# Patient Record
Sex: MALE | Race: WHITE | NOT HISPANIC OR LATINO | Employment: FULL TIME | URBAN - METROPOLITAN AREA
[De-identification: names, ages, dates, MRNs, and addresses within clinical notes are randomized per-mention and may not be internally consistent; named-entity substitution may affect disease eponyms.]

---

## 2023-08-15 LAB
ALANINE AMINOTRANSFERASE (SGPT) (U/L) IN SER/PLAS: 11 U/L (ref 10–52)
ALBUMIN (G/DL) IN SER/PLAS: 4.2 G/DL (ref 3.4–5)
ALKALINE PHOSPHATASE (U/L) IN SER/PLAS: 65 U/L (ref 33–120)
ANION GAP IN SER/PLAS: 9 MMOL/L (ref 10–20)
ASPARTATE AMINOTRANSFERASE (SGOT) (U/L) IN SER/PLAS: 15 U/L (ref 9–39)
BILIRUBIN TOTAL (MG/DL) IN SER/PLAS: 0.6 MG/DL (ref 0–1.2)
CALCIDIOL (25 OH VITAMIN D3) (NG/ML) IN SER/PLAS: 28 NG/ML
CALCIUM (MG/DL) IN SER/PLAS: 9.2 MG/DL (ref 8.6–10.3)
CARBON DIOXIDE, TOTAL (MMOL/L) IN SER/PLAS: 30 MMOL/L (ref 21–32)
CHLORIDE (MMOL/L) IN SER/PLAS: 104 MMOL/L (ref 98–107)
CREATININE (MG/DL) IN SER/PLAS: 0.99 MG/DL (ref 0.5–1.3)
GFR MALE: 90 ML/MIN/1.73M2
GLUCOSE (MG/DL) IN SER/PLAS: 91 MG/DL (ref 74–99)
PARATHYRIN INTACT (PG/ML) IN SER/PLAS: 52.7 PG/ML (ref 18.5–88)
PHOSPHATE (MG/DL) IN SER/PLAS: 2.8 MG/DL (ref 2.5–4.9)
POC CALCIUM IONIZED (MMOL/L) IN BLOOD: 1.2 MMOL/L (ref 1.1–1.33)
POTASSIUM (MMOL/L) IN SER/PLAS: 4.6 MMOL/L (ref 3.5–5.3)
PROTEIN TOTAL: 6.7 G/DL (ref 6.4–8.2)
SODIUM (MMOL/L) IN SER/PLAS: 138 MMOL/L (ref 136–145)
THYROTROPIN (MIU/L) IN SER/PLAS BY DETECTION LIMIT <= 0.05 MIU/L: 1.2 MIU/L (ref 0.44–3.98)
THYROXINE (T4) FREE (NG/DL) IN SER/PLAS: 1.04 NG/DL (ref 0.61–1.12)
TRIIODOTHYRONINE (T3) FREE (PG/ML) IN SER/PLAS: 3.7 PG/ML (ref 2.3–4.2)
UREA NITROGEN (MG/DL) IN SER/PLAS: 13 MG/DL (ref 6–23)

## 2023-08-18 LAB — VITAMIN D 1,25-DIHYDROXY: 57.6 PG/ML (ref 19.9–79.3)

## 2023-10-02 ENCOUNTER — LAB (OUTPATIENT)
Dept: LAB | Facility: LAB | Age: 58
End: 2023-10-02
Payer: COMMERCIAL

## 2023-10-02 DIAGNOSIS — D35.1 BENIGN NEOPLASM OF PARATHYROID GLAND: Primary | ICD-10-CM

## 2023-10-02 LAB
CALCIUM (MG/L) IN 24 HOUR URINE: 4 MG/24H (ref 100–300)
CALCIUM 24H UR-MRATE: 14.8 MG/DL
COLLECT DURATION TIME SPEC: 24 HRS
CREAT 24H UR-MCNC: 74.7 MG/DL (ref 20–370)
CREAT 24H UR-MRATE: 0.02 G/24 H
SPECIMEN VOL 24H UR: 24 ML

## 2023-10-03 LAB
CALCIUM (MG/DL) IN URINE: NORMAL
CALCIUM (MG/L) IN 24 HOUR URINE: NORMAL
CALCIUM/CREAT RATIO - DATA CONVERSION: NORMAL MG/G CREAT (ref 0–209)
COLLECTION DURATION OF URINE: NORMAL HR
CREATININE (MG/24HR) IN 24 HOUR URINE: NORMAL
CREATININE (MG/DL) IN URINE: NORMAL
VOLUME OF URINE: NORMAL ML

## 2023-12-29 ENCOUNTER — HOSPITAL ENCOUNTER (OUTPATIENT)
Dept: RADIOLOGY | Facility: HOSPITAL | Age: 58
Discharge: HOME | End: 2023-12-29
Payer: COMMERCIAL

## 2023-12-29 DIAGNOSIS — M25.512 PAIN IN LEFT SHOULDER: ICD-10-CM

## 2023-12-29 PROCEDURE — 73221 MRI JOINT UPR EXTREM W/O DYE: CPT | Mod: LT

## 2023-12-29 PROCEDURE — 73221 MRI JOINT UPR EXTREM W/O DYE: CPT | Mod: LEFT SIDE | Performed by: STUDENT IN AN ORGANIZED HEALTH CARE EDUCATION/TRAINING PROGRAM

## 2024-01-13 ENCOUNTER — APPOINTMENT (OUTPATIENT)
Dept: RADIOLOGY | Facility: HOSPITAL | Age: 59
End: 2024-01-13
Payer: COMMERCIAL

## 2024-01-24 ENCOUNTER — EVALUATION (OUTPATIENT)
Dept: PHYSICAL THERAPY | Facility: CLINIC | Age: 59
End: 2024-01-24
Payer: COMMERCIAL

## 2024-01-24 DIAGNOSIS — M43.6 NECK STIFFNESS: ICD-10-CM

## 2024-01-24 DIAGNOSIS — M54.2 NECK PAIN: Primary | ICD-10-CM

## 2024-01-24 DIAGNOSIS — M50.321 OTHER CERVICAL DISC DEGENERATION AT C4-C5 LEVEL: ICD-10-CM

## 2024-01-24 DIAGNOSIS — M47.812 SPONDYLOSIS WITHOUT MYELOPATHY OR RADICULOPATHY, CERVICAL REGION: ICD-10-CM

## 2024-01-24 PROCEDURE — 97161 PT EVAL LOW COMPLEX 20 MIN: CPT | Mod: GP

## 2024-01-24 PROCEDURE — 97140 MANUAL THERAPY 1/> REGIONS: CPT | Mod: GP

## 2024-01-24 ASSESSMENT — ENCOUNTER SYMPTOMS
DEPRESSION: 0
LOSS OF SENSATION IN FEET: 0
OCCASIONAL FEELINGS OF UNSTEADINESS: 0

## 2024-01-24 ASSESSMENT — PATIENT HEALTH QUESTIONNAIRE - PHQ9
2. FEELING DOWN, DEPRESSED OR HOPELESS: NOT AT ALL
1. LITTLE INTEREST OR PLEASURE IN DOING THINGS: NOT AT ALL
SUM OF ALL RESPONSES TO PHQ9 QUESTIONS 1 AND 2: 0

## 2024-01-24 NOTE — LETTER
April 15, 2024      No Recipients    Patient: Arian Forde   YOB: 1965   Date of Visit: 1/24/2024       Dear No referring provider defined for this encounter.    The attached plan of care is being sent to you because your patient’s medical reimbursement requires that you certify the plan of care. Your signature is required to allow uninterrupted insurance coverage.      You may indicate your approval by signing below and faxing this form back to us at Dept Fax: [phone].    Please call Dept: [phone] with any questions or concerns.    Thank you for this referral,        Stephania Louie , PT  [facility] [place] [address] [place], [place] [address] [facility] [address]    Payer: Payor: BRADY / Plan: [employer] / Product Type: *No Product type* /                                                                         Date:     Dear Stephania Louie , PT,     Re: Mr. Arian Forde, MRN:[id number]    I certify that I have reviewed the attached plan of care and it is medically necessary for Mr. Arian Forde (1965) who is under my care.          ______________________________________                    _________________  Provider name and credentials                                           Date and time                                                                                           Plan of Care 1/24/24   Effective from: 1/24/2024  Effective to: 3/21/2024    Plan ID: [id number]                Participants as of Finalize on 1/25/2024      Name Type Comments Contact Info    Stephania Louie, PT Physical Therapist  [phone]    Nakul Meyer MD PCP - General  [phone]           Last Plan Note       Author: Stephania Louie PT Status: Incomplete Last edited: 1/24/2024  3:45 PM         Physical Therapy Evaluation and Treatment      Patient Name: Arian Forde  MRN: [id number]  Today's Date: 1/24/2024     Time Calculation  Start Time: 1545  Stop Time:  1645  Time Calculation (min): 60 min  PT Therapeutic Procedures Time Entry  Manual Therapy Time Entry: 40  Self-Care/Home Mgmt Trainin  Visit:   Referred by: Nakul Meyer MD    Insurance:  [facility]   Certification Dates:  24 to 3-  Current Problem:   1. Neck pain        2. Neck stiffness              Subjective:  Pt stated he has been having neck stiffness for about 2 yrs now.  He see a chiropractic and LMT    Patient's Living Environment/PLOF: Pt lives with spouse and son.  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  Patient's Therapy Goals: to decrease stiffness, be able to move his neck better     Pain:  Intensity: 2/10            Location: entire cervical spine, bilaterally            Duration: Constant            Aggravating Factor: prolonged positions            Alleviating Factor:  current with chiropractic, and LMT          Objective  Cervical AROM: (degrees)   Flexion 37   Extension 20   Right Sidebend 15   Left Sidebend 13   Right Rotation 32   Left Rotation 32     Posture:  FHRS,   Rt shoulder FROM all planes, strength 5/5 at shld, elbow wrist/hand  Lt shoulder 3/4 ROM for flex abd, and ER at 90,  IR to Lt glute,  MMT NT + for RTCT, awaiting surgery in April  Palpation:  Special tests:   Dermatomal Impairment:    Outcome Measures:  NDI:  30%    Treatments:  Manual:    Assessment:     Pt is a 57 yo male who presented to the clinic today c/o neck stiffness.  PMH significant for Left RTCT,Lt bicep tendon repair ,back surgery ,L4,L5 fracture.  Examination reveals the following deficits:  significant ROM deficits in his c-spine,all planes.  decreased flexibility, poor posture, limitations with ADL's due to stiffness. These deficits lead to difficulties with ADLs as well as recreational activity. Skilled PT will address these deficits to help reduce pain for return to PLOF. He will benefit from skilled PT to address the above mentioned impairments.      Low  complexity due to patient's clinical presentation being stable and uncomplicated by any significant comorbidities that may affect rehab tolerance and progression.     Plan: 2 x week for 6 wks, to include HEP, self-management, ther ex for UE strengthening, activity progression, modalities for pain relief, posture re-ed, NMR         EDUCATION: HEP, POC, activity modifications/progression, pain management/modalities, and injury pathology     Goals:   Active       PT Problem       PT Goal 1       Start:  01/24/24    Expected End:  03/20/24       Pt independent with HEP and consistent for optimal recovery and self-management after DC from PT.             PT Goal 2       Start:  01/24/24    Expected End:  03/20/24       Pt will decrease neck pain to < or = 2-3/10 to improve tolerance to ADL's, work         PT Goal 3       Start:  01/24/24    Expected End:  03/20/24       Pt will decrease NDI to < or = 20% to demonstrate functional improvement.           PT Goal 4       Start:  01/24/24    Expected End:  03/20/24       Pt will improve cervical AROM to > or = 3/4 AROM all planes to improve functional mobility, decrease stiffness                                Current Participants as of 4/15/2024      Name Type Comments Contact Info    Stephania Louie, PT Physical Therapist  [phone]    Signed by Stephania Louie, GREG     Signature documented by Stephania Louie PT at 3/14/2024 1023 EDT     Reason: Participant Signed on Paper    Nakul Meyer MD PCP - General  [phone]

## 2024-01-24 NOTE — PROGRESS NOTES
Physical Therapy Evaluation and Treatment      Patient Name: Arian Forde  MRN: [id number]  Today's Date: 24     Time Calculation  Start Time: 1545  Stop Time: 5  Time Calculation (min): 60 min  PT Therapeutic Procedures Time Entry  Manual Therapy Time Entry: 40  Self-Care/Home Mgmt Trainin  Visit:   Referred by: Nakul Meyer MD  Insurance:  [facility]   Certification Dates:  24 to 3-  Current Problem: {M47.812, M50.321]    1. Neck pain        2. Neck stiffness        3. Spondylosis without myelopathy or radiculopathy, cervical region        4. Other cervical disc degeneration at C4-C5 level              Subjective:  Pt stated he has been having neck stiffness for about 2 yrs now.  He see a chiropractic and LMT    Patient's Living Environment/PLOF: Pt lives with spouse and son.  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  Patient's Therapy Goals: to decrease stiffness, be able to move his neck better     Pain:  Intensity: 2/10            Location: entire cervical spine, bilaterally            Duration: Constant            Aggravating Factor: prolonged positions            Alleviating Factor:  current with chiropractic, and LMT          Objective   Cervical AROM: (degrees)   Flexion 37   Extension 20   Right Sidebend 15   Left Sidebend 13   Right Rotation 32   Left Rotation 32     Posture:  FHRS, kyphosis  Rt shoulder FROM all planes, strength 5/5 at shld, elbow wrist/hand  Lt shoulder 3/4 ROM for flex abd, and ER at 90,  IR to Lt glute,  MMT NT + for RTCT, awaiting surgery in April  Palpation:  + tenderness, Rt C2-C4, bilateral UT's, CTJ  Outcome Measures:  NDI:  30%    Treatments:    Manual:  Performed rotational grade IV mobs, bilaterally to upper cervical and lower segments C2-C7, side glides, (B), overpressure with max rotation into flexion, , and PA mobs T2-T5 and adjacent ribs, inferior glides for pain relief     Assessment:     Pt is a 57 yo male who presented to  the clinic today c/o neck stiffness.  PMH significant for Left RTCT,Lt bicep tendon repair 2019,back surgery 1985,L4,L5 fracture.  Examination reveals the following deficits:  significant ROM deficits in his c-spine,all planes.  decreased flexibility, poor posture, limitations with ADL's due to stiffness. These deficits lead to difficulties with ADLs as well as recreational activity. Skilled PT will address these deficits to help reduce pain for return to PLOF. He will benefit from skilled PT to address the above mentioned impairments.      Low complexity due to patient's clinical presentation being stable and uncomplicated by any significant comorbidities that may affect rehab tolerance and progression.     Plan: 2 x week for 6 wks, to include HEP, self-management, ther ex for UE strengthening, activity progression, modalities for pain relief, posture re-ed, NMR         EDUCATION: HEP, POC, activity modifications/progression, pain management/modalities, and injury pathology     Goals:   Active       PT Problem       PT Goal 1       Start:  01/24/24    Expected End:  03/20/24       Pt independent with HEP and consistent for optimal recovery and self-management after DC from PT.             PT Goal 2       Start:  01/24/24    Expected End:  03/20/24       Pt will decrease neck pain to < or = 2-3/10 to improve tolerance to ADL's, work         PT Goal 3       Start:  01/24/24    Expected End:  03/20/24       Pt will decrease NDI to < or = 20% to demonstrate functional improvement.           PT Goal 4       Start:  01/24/24    Expected End:  03/20/24       Pt will improve cervical AROM to > or = 3/4 AROM all planes to improve functional mobility, decrease stiffness

## 2024-01-24 NOTE — Clinical Note
March 14, 2024      No Recipients    Patient: Arian Forde   YOB: 1965   Date of Visit: 1/24/2024       Dear No referring provider defined for this encounter.    The attached plan of care is being sent to you because your patient’s medical reimbursement requires that you certify the plan of care. Your signature is required to allow uninterrupted insurance coverage.      You may indicate your approval by signing below and faxing this form back to us at Dept Fax: [phone].    Please call Dept: [phone] with any questions or concerns.    Thank you for this referral,        Stephania Louie , PT  [facility] [address] [place], [place] [address] [facility] [address]    Payer: Payor: BRADY / Plan: [employer] / Product Type: *No Product type* /                                                                         Date:     Dear Stephania Louie , PT,     Re: Mr. Arian Forde, MRN:[id number]    I certify that I have reviewed the attached plan of care and it is medically necessary for Mr. Arian Forde (1965) who is under my care.          ______________________________________                    _________________  Provider name and credentials                                           Date and time                                                                                           Plan of Care 1/24/24   Effective from: 1/24/2024  Effective to: 3/21/2024    Plan ID: [id number]            Participants as of Finalize on 1/25/2024    Name Type Comments Contact Info    Stephania Louie, PT Physical Therapist  [phone]    Nakul Meyer MD PCP - General  [phone]       Last Plan Note     Author: Stephania Louie PT Status: Incomplete Last edited: 1/24/2024  3:45 PM       Physical Therapy Evaluation and Treatment      Patient Name: Arian Forde  MRN: [id number]  Today's Date: 1/24/2024     Time Calculation  Start Time: 1545  Stop Time: 1645  Time  Calculation (min): 60 min  PT Therapeutic Procedures Time Entry  Manual Therapy Time Entry: 40  Self-Care/Home Mgmt Trainin  Visit:   Referred by: Nakul Meyer MD    Insurance:  [facility]   Certification Dates:  24 to 3-  Current Problem:   1. Neck pain        2. Neck stiffness              Subjective:  Pt stated he has been having neck stiffness for about 2 yrs now.  He see a chiropractic and LMT    Patient's Living Environment/PLOF: Pt lives with spouse and son.  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  Patient's Therapy Goals: to decrease stiffness, be able to move his neck better     Pain:  Intensity: 2/10            Location: entire cervical spine, bilaterally            Duration: Constant            Aggravating Factor: prolonged positions            Alleviating Factor:  current with chiropractic, and LMT          Objective  Cervical AROM: (degrees)   Flexion 37   Extension 20   Right Sidebend 15   Left Sidebend 13   Right Rotation 32   Left Rotation 32     Posture:  FHRS,   Rt shoulder FROM all planes, strength 5/5 at shld, elbow wrist/hand  Lt shoulder 3/4 ROM for flex abd, and ER at 90,  IR to Lt glute,  MMT NT + for RTCT, awaiting surgery in April  Palpation:  Special tests:   Dermatomal Impairment:    Outcome Measures:  NDI:  30%    Treatments:  Manual:    Assessment:     Pt is a 59 yo male who presented to the clinic today c/o neck stiffness.  PMH significant for Left RTCT,Lt bicep tendon repair ,back surgery ,L4,L5 fracture.  Examination reveals the following deficits:  significant ROM deficits in his c-spine,all planes.  decreased flexibility, poor posture, limitations with ADL's due to stiffness. These deficits lead to difficulties with ADLs as well as recreational activity. Skilled PT will address these deficits to help reduce pain for return to PLOF. He will benefit from skilled PT to address the above mentioned impairments.      Low complexity due to  patient's clinical presentation being stable and uncomplicated by any significant comorbidities that may affect rehab tolerance and progression.     Plan: 2 x week for 6 wks, to include HEP, self-management, ther ex for UE strengthening, activity progression, modalities for pain relief, posture re-ed, NMR         EDUCATION: HEP, POC, activity modifications/progression, pain management/modalities, and injury pathology     Goals:   Active       PT Problem       PT Goal 1       Start:  01/24/24    Expected End:  03/20/24       Pt independent with HEP and consistent for optimal recovery and self-management after DC from PT.             PT Goal 2       Start:  01/24/24    Expected End:  03/20/24       Pt will decrease neck pain to < or = 2-3/10 to improve tolerance to ADL's, work         PT Goal 3       Start:  01/24/24    Expected End:  03/20/24       Pt will decrease NDI to < or = 20% to demonstrate functional improvement.           PT Goal 4       Start:  01/24/24    Expected End:  03/20/24       Pt will improve cervical AROM to > or = 3/4 AROM all planes to improve functional mobility, decrease stiffness                                  Current Participants as of 3/14/2024    Name Type Comments Contact Info    Stephania Louie, PT Physical Therapist  [phone]    Signed by Stephania Louie PT     Signature documented by Stephania Louie PT at 3/14/2024 1023 EDT     Reason: Participant Signed on Paper    Nakul Meyer MD PCP - General  [phone]

## 2024-01-24 NOTE — LETTER
March 14, 2024    Nakul Meyer MD  [address] [facility] [address]    Patient: Arian Forde   YOB: 1965   Date of Visit: 1/24/2024       Dear No referring provider defined for this encounter.    The attached plan of care is being sent to you because your patient’s medical reimbursement requires that you certify the plan of care. Your signature is required to allow uninterrupted insurance coverage.      You may indicate your approval by signing below and faxing this form back to us at Dept Fax: [phone].    Please call Dept: [phone] with any questions or concerns.    Thank you for this referral,        Stephania Louie , PT  [facility] [address] [place], [place] [address] [facility] [address]    Payer: Payor: BRADY / Plan: [employer] / Product Type: *No Product type* /                                                                         Date:     Dear Stephania Louie PT,     Re: Mr. Arian Forde, MRN:[id number]    I certify that I have reviewed the attached plan of care and it is medically necessary for Mr. Arian Forde (1965) who is under my care.          ______________________________________                    _________________  Provider name and credentials                                           Date and time                                                                                           Plan of Care 1/24/24   Effective from: 1/24/2024  Effective to: 3/21/2024    Plan ID: [id number]                Participants as of Finalize on 1/25/2024      Name Type Comments Contact Info    Stephania Louie PT Physical Therapist  [phone]    Nakul Meyer MD PCP - General  [phone]           Last Plan Note       Author: Stephania Louie PT Status: Incomplete Last edited: 1/24/2024  3:45 PM         Physical Therapy Evaluation and Treatment      Patient Name: Arian Forde  MRN: [id number]  Today's Date:  2024     Time Calculation  Start Time: 1545  Stop Time: 1645  Time Calculation (min): 60 min  PT Therapeutic Procedures Time Entry  Manual Therapy Time Entry: 40  Self-Care/Home Mgmt Trainin  Visit:   Referred by: Nakul Meyer MD    Insurance:  [facility]   Certification Dates:  24 to 3-  Current Problem:   1. Neck pain        2. Neck stiffness              Subjective:  Pt stated he has been having neck stiffness for about 2 yrs now.  He see a chiropractic and LMT    Patient's Living Environment/PLOF: Pt lives with spouse and son.  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  Patient's Therapy Goals: to decrease stiffness, be able to move his neck better     Pain:  Intensity: 2/10            Location: entire cervical spine, bilaterally            Duration: Constant            Aggravating Factor: prolonged positions            Alleviating Factor:  current with chiropractic, and LMT          Objective  Cervical AROM: (degrees)   Flexion 37   Extension 20   Right Sidebend 15   Left Sidebend 13   Right Rotation 32   Left Rotation 32     Posture:  FHRS,   Rt shoulder FROM all planes, strength 5/5 at shld, elbow wrist/hand  Lt shoulder 3/4 ROM for flex abd, and ER at 90,  IR to Lt glute,  MMT NT + for RTCT, awaiting surgery in April  Palpation:  Special tests:   Dermatomal Impairment:    Outcome Measures:  NDI:  30%    Treatments:  Manual:    Assessment:     Pt is a 57 yo male who presented to the clinic today c/o neck stiffness.  PMH significant for Left RTCT,Lt bicep tendon repair ,back surgery ,L4,L5 fracture.  Examination reveals the following deficits:  significant ROM deficits in his c-spine,all planes.  decreased flexibility, poor posture, limitations with ADL's due to stiffness. These deficits lead to difficulties with ADLs as well as recreational activity. Skilled PT will address these deficits to help reduce pain for return to PLOF. He will benefit from skilled PT  to address the above mentioned impairments.      Low complexity due to patient's clinical presentation being stable and uncomplicated by any significant comorbidities that may affect rehab tolerance and progression.     Plan: 2 x week for 6 wks, to include HEP, self-management, ther ex for UE strengthening, activity progression, modalities for pain relief, posture re-ed, NMR         EDUCATION: HEP, POC, activity modifications/progression, pain management/modalities, and injury pathology     Goals:   Active       PT Problem       PT Goal 1       Start:  01/24/24    Expected End:  03/20/24       Pt independent with HEP and consistent for optimal recovery and self-management after DC from PT.             PT Goal 2       Start:  01/24/24    Expected End:  03/20/24       Pt will decrease neck pain to < or = 2-3/10 to improve tolerance to ADL's, work         PT Goal 3       Start:  01/24/24    Expected End:  03/20/24       Pt will decrease NDI to < or = 20% to demonstrate functional improvement.           PT Goal 4       Start:  01/24/24    Expected End:  03/20/24       Pt will improve cervical AROM to > or = 3/4 AROM all planes to improve functional mobility, decrease stiffness                                Current Participants as of 3/14/2024      Name Type Comments Contact Info    Stephania Louie PT Physical Therapist  [phone]    Signed by Stephania Louie PT     Signature documented by Stephania Louie PT at 3/14/2024 1023 EDT     Reason: Participant Signed on Paper    Nakul Meyer MD PCP - General  [phone]

## 2024-01-25 PROBLEM — M50.321 OTHER CERVICAL DISC DEGENERATION AT C4-C5 LEVEL: Status: ACTIVE | Noted: 2024-01-25

## 2024-01-25 PROBLEM — M47.812 SPONDYLOSIS WITHOUT MYELOPATHY OR RADICULOPATHY, CERVICAL REGION: Status: ACTIVE | Noted: 2024-01-25

## 2024-01-29 ENCOUNTER — TREATMENT (OUTPATIENT)
Dept: PHYSICAL THERAPY | Facility: CLINIC | Age: 59
End: 2024-01-29
Payer: COMMERCIAL

## 2024-01-29 ENCOUNTER — OFFICE VISIT (OUTPATIENT)
Dept: ORTHOPEDIC SURGERY | Facility: CLINIC | Age: 59
End: 2024-01-29
Payer: COMMERCIAL

## 2024-01-29 VITALS — WEIGHT: 190 LBS | BODY MASS INDEX: 28.79 KG/M2 | HEIGHT: 68 IN

## 2024-01-29 DIAGNOSIS — M25.812 SHOULDER IMPINGEMENT, LEFT: ICD-10-CM

## 2024-01-29 DIAGNOSIS — M47.812 SPONDYLOSIS WITHOUT MYELOPATHY OR RADICULOPATHY, CERVICAL REGION: ICD-10-CM

## 2024-01-29 DIAGNOSIS — M50.321 OTHER CERVICAL DISC DEGENERATION AT C4-C5 LEVEL: ICD-10-CM

## 2024-01-29 DIAGNOSIS — M54.2 NECK PAIN: ICD-10-CM

## 2024-01-29 DIAGNOSIS — S43.432A DEGENERATIVE SUPERIOR LABRAL ANTERIOR-TO-POSTERIOR (SLAP) TEAR OF LEFT SHOULDER: ICD-10-CM

## 2024-01-29 DIAGNOSIS — M43.6 NECK STIFFNESS: Primary | ICD-10-CM

## 2024-01-29 DIAGNOSIS — M75.122 NONTRAUMATIC COMPLETE TEAR OF LEFT ROTATOR CUFF: Primary | ICD-10-CM

## 2024-01-29 DIAGNOSIS — M75.22 BICEPS TENDINITIS, LEFT: ICD-10-CM

## 2024-01-29 PROCEDURE — 97140 MANUAL THERAPY 1/> REGIONS: CPT | Mod: GP

## 2024-01-29 PROCEDURE — 99204 OFFICE O/P NEW MOD 45 MIN: CPT | Performed by: STUDENT IN AN ORGANIZED HEALTH CARE EDUCATION/TRAINING PROGRAM

## 2024-01-29 PROCEDURE — L3670 SO ACRO/CLAV CAN WEB PRE OTS: HCPCS | Performed by: STUDENT IN AN ORGANIZED HEALTH CARE EDUCATION/TRAINING PROGRAM

## 2024-01-29 RX ORDER — TIZANIDINE 4 MG/1
4 TABLET ORAL NIGHTLY PRN
COMMUNITY
Start: 2023-05-12

## 2024-01-29 ASSESSMENT — PAIN - FUNCTIONAL ASSESSMENT: PAIN_FUNCTIONAL_ASSESSMENT: 0-10

## 2024-01-29 ASSESSMENT — ENCOUNTER SYMPTOMS
OCCASIONAL FEELINGS OF UNSTEADINESS: 0
DEPRESSION: 0
LOSS OF SENSATION IN FEET: 0

## 2024-01-29 ASSESSMENT — PAIN SCALES - GENERAL: PAINLEVEL_OUTOF10: 2

## 2024-01-29 NOTE — PROGRESS NOTES
PRIMARY CARE PHYSICIAN: Nakul Meyer MD  REFERRING PROVIDER: Nakul Meyer MD  [address] [place] [facility] [address]     CONSULT ORTHOPAEDIC: Shoulder Evaluation    ASSESSMENT & PLAN    Impression: 58 y.o. male with left shoulder full-thickness rotator cuff tear, degenerative SLAP tear, biceps tendinosis, subacromial impingement/bursitis.    Plan:   I explained to the patient the nature of their diagnosis.  I reviewed their imaging studies with them.    Based on the history, physical exam and imaging studies above, the patient's presentation is consistent with the above diagnosis.  I had a long discussion with the patient regarding their presentation and the treatment options.  We discussed initial nonoperative versus operative management options as well as potential further diagnostic imaging.  I reviewed the patient's MRI findings with him.  We discussed the treatment options going forward.  He is active and requires the arm to be functional and strong to do his normal daily activities and work.  After long discussion, I do recommend surgical intervention to repair his full-thickness rotator cuff tear and the patient agrees.  The plan is for a left shoulder arthroscopy, rotator cuff repair, intra-articular debridement and synovectomy, subacromial decompression and acromioplasty and open subpectoral biceps tenodesis.  I thoroughly explained the risks and benefits as well as the expected postoperative timeline for the proposed procedure versus nonoperative management. Risks of this procedure include but are not limited to bleeding, infection, nerve injury, DVT and failure of repair or implant.  The patient expressed understanding and wished to proceed with surgical intervention.  All questions were answered. We will begin the presurgical process and find them a surgical date in a timely fashion that works for them.    The patient will require an abduction sling for postoperative joint  stability. Additionally, in order to decrease the amount of narcotic pain medication usage and improve his pain control and swelling, I recommend a cryotherapy machine.    At the end of the visit, all questions were answered in full. The patient is in agreement with the plan and recommendations. They will call the office with any questions/concerns.    Note dictated with [employer] software. Completed without full typed error editing and sent to avoid delay.       SUBJECTIVE  CHIEF COMPLAINT:   Chief Complaint   Patient presents with    Left Shoulder - Pain      HPI: Arian Forde is a 58 y.o. patient. Arian Forde complains of left shoulder pain for about a year with no known injury.  He did have a prior left distal biceps tendon repair on 07/2019.  He has had pain in his shoulder off and on since then.  He has pain with motion but good range of motion.  He states he has noticed he is losing strength.  He failed nonoperative management including physical therapy, activity modification and anti-inflammatories.  He had a recent MRI done which confirms a full-thickness rotator cuff tear.  He tried Meloxicam with no relief.  He was referred to me for surgical consultation.    They deny any associated neck pain.  No numbness, tingling, or paresthesias.    REVIEW OF SYSTEMS  Constitutional: See HPI for pain assessment, No significant weight loss, recent trauma  Cardiovascular: No chest pain, shortness of breath  Respiratory: No difficulty breathing, cough  Gastrointestinal: No nausea, vomiting, diarrhea, constipation  Musculoskeletal: Noted in HPI, positive for pain, restricted motion, stiffness  Integumentary: No rashes, easy bruising, redness   Neurological: no numbness or tingling in extremities, no gait disturbances   Psychiatric: No mood changes, memory changes, social issues  Heme/Lymph: no excessive swelling, easy bruising, excessive bleeding  ENT: No hearing changes  Eyes: No vision  changes    No past medical history on file.     Not on File     No past surgical history on file.     No family history on file.     Social History     Socioeconomic History    Marital status:      Spouse name: Not on file    Number of children: Not on file    Years of education: Not on file    Highest education level: Not on file   Occupational History    Not on file   Tobacco Use    Smoking status: Not on file    Smokeless tobacco: Not on file   Substance and Sexual Activity    Alcohol use: Not on file    Drug use: Not on file    Sexual activity: Not on file   Other Topics Concern    Not on file   Social History Narrative    Not on file     Social Determinants of Health     Financial Resource Strain: Not on file   Food Insecurity: Not on file   Transportation Needs: Not on file   Physical Activity: Not on file   Stress: Not on file   Social Connections: Not on file   Intimate Partner Violence: Not on file   Housing Stability: Not on file        CURRENT MEDICATIONS:   No current outpatient medications on file.     No current facility-administered medications for this visit.        OBJECTIVE    PHYSICAL EXAM      12/29/2023     8:00 AM   Vitals   Weight (lb) 150      There is no height or weight on file to calculate BMI.    GENERAL: A/Ox3, NAD. Appears healthy, well nourished  PSYCHIATRIC: Mood stable, appropriate memory recall  EYES: EOM intact, no scleral icterus  CARDIOVASCULAR: Palpable peripheral pulses  LUNGS: Breathing non-labored on room air  SKIN: no erythema, rashes, or ecchymosis     MUSCULOSKELETAL:  Laterality: left Shoulder Exam  - Negative Spurlings, full painless neck ROM  - Skin intact  - No erythema or warmth  - No ecchymosis or soft tissue swelling  - Shoulder ROM: Forward flexion to 160 with a hitch at 90, ER 45, IR upper lumbar  - Strength:      Jobes 4/5     ER 5-/5     Belly press and bearhug 5-/5  - Palpation: Positive tenderness biceps groove and posterolateral acromion  - Ramirez and  Neers positive  - Speeds and O'Briens positive    NEUROVASCULAR:  - Neurovascular Status: sensation intact to light touch distally, upper extremity motor grossly intact  - Capillary refill brisk at extremities, Bilateral peripheral pulses 2+    Imaging: MRI of the left shoulder reviewed by me demonstrates a full-thickness supraspinatus tear, minimal degenerative changes of the glenohumeral joint, degenerative SLAP tear, biceps tendinosis, subacromial impingement/bursitis.  Images were personally reviewed and interpreted by me.  Radiology reports were reviewed by me as well, if readily available at the time.      Horacio Mukherjee MD  Attending Surgeon    Sports Medicine Orthopaedic Surgery  [facility] [place]  [facility]

## 2024-01-30 NOTE — PROGRESS NOTES
Physical Therapy Treatment Note     Patient Name: Arian Forde  MRN: [id number]  Today's Date: 1-29-24     Time Calculation  Start Time: 1530  Stop Time: 1630  Time Calculation (min): 60 min  PT Therapeutic Procedures Time Entry  Manual Therapy Time Entry: 53  Therapeutic Exercise Time Entry: 5  Visit: 2/12  Referred by: Nakul Meyer MD  Insurance:  [facility]   Certification Dates:  1-24-24 to 3-  Current Problem: {M47.812, M50.321]    1. Neck stiffness        2. Neck pain        3. Spondylosis without myelopathy or radiculopathy, cervical region        4. Other cervical disc degeneration at C4-C5 level              Subjective:  No issues after the 1st visit. He saw a case this morning, was in the car about 3 + hrs, neck is stiff from driving.    Patient's Living Environment/PLOF: Pt lives with spouse and son.  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    Patient's Therapy Goals: to decrease stiffness, be able to move his neck better     Pain:  Intensity: 2/10            Location: entire cervical spine, bilaterally, right C2 >> left            Duration: Constant            Aggravating Factor: prolonged positions            Alleviating Factor:  current with chiropractic, and LMT          Objective     Treatments:    Exercise:      Levator Scap stretch,  3 x 30 sec  Upper Trap Stretch, 3 x 30 sec    Manual:  Performed rotational grade IV mobs, bilaterally to upper cervical and lower segments C2-C7, side glides, (+ cavitation on the right,  (B), overpressure with max rotation into flexion, , and PA mobs C2-T5 and adjacent ribs in spine, inferior glides for pain relief, suboccipital release, distraction, upper trap & lev scap stretching with overpressure     Assessment:     Pt presented to clinic very guarded, obvious stiff neck.  Manual performed as noted.  He had less symptoms, more mobility post treatment.      Plan: Continue per POC, to include HEP, self-management, ther ex for UE strengthening, activity progression, modalities for pain relief, posture re-ed, NMR

## 2024-01-31 PROBLEM — M25.812 SHOULDER IMPINGEMENT, LEFT: Status: ACTIVE | Noted: 2024-01-29

## 2024-01-31 PROBLEM — S43.432A DEGENERATIVE SUPERIOR LABRAL ANTERIOR-TO-POSTERIOR (SLAP) TEAR OF LEFT SHOULDER: Status: ACTIVE | Noted: 2024-01-29

## 2024-01-31 PROBLEM — M75.122 NONTRAUMATIC COMPLETE TEAR OF LEFT ROTATOR CUFF: Status: ACTIVE | Noted: 2024-01-29

## 2024-01-31 PROBLEM — M75.22 BICEPS TENDINITIS, LEFT: Status: ACTIVE | Noted: 2024-01-29

## 2024-02-05 ENCOUNTER — TREATMENT (OUTPATIENT)
Dept: PHYSICAL THERAPY | Facility: CLINIC | Age: 59
End: 2024-02-05
Payer: COMMERCIAL

## 2024-02-05 DIAGNOSIS — M54.2 NECK PAIN: ICD-10-CM

## 2024-02-05 DIAGNOSIS — M43.6 NECK STIFFNESS: ICD-10-CM

## 2024-02-05 DIAGNOSIS — M50.321 OTHER CERVICAL DISC DEGENERATION AT C4-C5 LEVEL: ICD-10-CM

## 2024-02-05 DIAGNOSIS — M47.812 SPONDYLOSIS WITHOUT MYELOPATHY OR RADICULOPATHY, CERVICAL REGION: Primary | ICD-10-CM

## 2024-02-05 PROCEDURE — 97140 MANUAL THERAPY 1/> REGIONS: CPT | Mod: GP

## 2024-02-05 NOTE — PROGRESS NOTES
Physical Therapy Treatment Note     Patient Name: Arian Forde  MRN: [id number]  Today's Date: 2-5-24     Time Calculation  Start Time: 1530  Stop Time: 1630  Time Calculation (min): 60 min  PT Therapeutic Procedures Time Entry  Manual Therapy Time Entry: 55  Therapeutic Exercise Time Entry: 5  Visit: 3/12  Referred by: Nakul Meyer MD  Insurance:  [facility]   Certification Dates:  1-24-24 to 3-  Current Problem: {M47.812, M50.321]    1. Spondylosis without myelopathy or radiculopathy, cervical region        2. Other cervical disc degeneration at C4-C5 level        3. Neck pain        4. Neck stiffness              Subjective:  No issues after the visits, occasional soreness. He saw a case this morning, working & driving causes additional stiffness.      Patient's Living Environment/PLOF: Pt lives with spouse and son.  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    Patient's Therapy Goals: to decrease stiffness, be able to move his neck better     Pain:  Intensity: 2/10            Location: entire cervical spine, bilaterally, right C2 >> left            Duration: Constant            Aggravating Factor: prolonged positions            Alleviating Factor:  current with chiropractic, and LMT, his son has also been trying to help him stretch          Objective     Treatments:    Exercise:      Levator Scap stretch,  3 x 30 sec  Upper Trap Stretch, 3 x 30 sec    Manual:  Performed rotational grade IV mobs, bilaterally to upper cervical and lower segments C2-C7, side glides, (+ cavitation on the right,  (B), overpressure with max rotation into flexion, and PA mobs C2-T5 and adjacent ribs in spine, inferior glides for pain relief, suboccipital release, distraction, upper trap & lev scap stretching with overpressure, CTJ grade IV mobs (B), had cavitation on left with distraction     Assessment:     Pt presented to  "clinic very guarded, stiff neck posturing, upper traps \"hiked\".  Manual performed as noted.  He had less symptoms, more mobility post treatment.     Plan: Continue per POC, to include HEP, self-management, ther ex for UE strengthening, activity progression, modalities for pain relief, posture re-ed, NMR                         "

## 2024-02-08 ENCOUNTER — TREATMENT (OUTPATIENT)
Dept: PHYSICAL THERAPY | Facility: CLINIC | Age: 59
End: 2024-02-08
Payer: COMMERCIAL

## 2024-02-08 DIAGNOSIS — M47.812 SPONDYLOSIS WITHOUT MYELOPATHY OR RADICULOPATHY, CERVICAL REGION: ICD-10-CM

## 2024-02-08 DIAGNOSIS — M50.321 OTHER CERVICAL DISC DEGENERATION AT C4-C5 LEVEL: ICD-10-CM

## 2024-02-08 DIAGNOSIS — M54.2 NECK PAIN: Primary | ICD-10-CM

## 2024-02-08 DIAGNOSIS — M43.6 NECK STIFFNESS: ICD-10-CM

## 2024-02-08 PROCEDURE — 97140 MANUAL THERAPY 1/> REGIONS: CPT | Mod: GP

## 2024-02-09 NOTE — PROGRESS NOTES
Physical Therapy Treatment Note     Patient Name: Arian Forde  MRN: [id number]  Today's Date: 2/9/2024     Time Calculation  Start Time: 1745  Stop Time: 1845  Time Calculation (min): 60 min  PT Therapeutic Procedures Time Entry  Manual Therapy Time Entry: 55  Therapeutic Exercise Time Entry: 5  Visit: 4/12  Referred by: Nakul Meyer MD  Insurance:  [facility]   Certification Dates:  1-24-24 to 3-  Current Problem: {M47.812, M50.321]    1. Neck pain        2. Neck stiffness        3. Spondylosis without myelopathy or radiculopathy, cervical region        4. Other cervical disc degeneration at C4-C5 level              Subjective:  Pt stated he was really sosre after the last viit, but he has more mobility.   He was adjusted prior to today's visit, lots of cavitations in the t-spine.    Patient's Living Environment/PLOF: Pt lives with spouse and son.  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    Patient's Therapy Goals: to decrease stiffness, be able to move his neck better     Pain:  Intensity: 2/10            Location: entire cervical spine, bilaterally, right >> left            Duration: Constant            Aggravating Factor: prolonged positions            Alleviating Factor:  current with chiropractic, and LMT, his son has also been trying to help him stretch        Objective     Treatments:  Exercise:      Levator Scap stretch,  3 x 30 sec  Upper Trap Stretch, 3 x 30 sec    Manual:  Performed rotational grade IV mobs, bilaterally to upper cervical and lower segments C2-C7, side glides, (+ cavitation on the right,  (B), overpressure with max rotation into flexion, and PA mobs C2-T5 and adjacent ribs in spine, inferior glides for pain relief, suboccipital release, distraction, upper trap & lev scap stretching with overpressure, CTJ grade IV mobs (B), had smaller cavitations thrhoughout today    "  Assessment:     Pt stated benefit of seeing chiro, MT and PT.  It's the first time in a long while that he can rotate his neck without turning the body.  He stiffens up again after about 24-36 hrs though.  His typical posture is FHRS, guarded, stiff neck posturing, upper traps \"hiked\".  Manual performed as noted.  He reported less symptoms, more mobility post treatment.     Plan: Continue per POC and pt tolerance      "

## 2024-02-13 ENCOUNTER — APPOINTMENT (OUTPATIENT)
Dept: PHYSICAL THERAPY | Facility: CLINIC | Age: 59
End: 2024-02-13
Payer: COMMERCIAL

## 2024-02-20 ENCOUNTER — TREATMENT (OUTPATIENT)
Dept: PHYSICAL THERAPY | Facility: CLINIC | Age: 59
End: 2024-02-20
Payer: COMMERCIAL

## 2024-02-20 DIAGNOSIS — M47.812 SPONDYLOSIS WITHOUT MYELOPATHY OR RADICULOPATHY, CERVICAL REGION: ICD-10-CM

## 2024-02-20 DIAGNOSIS — M50.321 OTHER CERVICAL DISC DEGENERATION AT C4-C5 LEVEL: Primary | ICD-10-CM

## 2024-02-20 DIAGNOSIS — M43.6 NECK STIFFNESS: ICD-10-CM

## 2024-02-20 DIAGNOSIS — M54.2 NECK PAIN: ICD-10-CM

## 2024-02-20 PROCEDURE — 97140 MANUAL THERAPY 1/> REGIONS: CPT | Mod: GP

## 2024-02-20 NOTE — PROGRESS NOTES
Physical Therapy Treatment Note     Patient Name: Arian Forde  MRN: [id number]  Today's Date: 2/20/2024     Time Calculation  Start Time: 1800  Stop Time: 1900  Time Calculation (min): 60 min  PT Therapeutic Procedures Time Entry  Manual Therapy Time Entry: 60  Visit: 5/12  Referred by: Nakul Meyer MD  [facility]   Certification Dates:  1-24-24 to 3-  Current Problem: {M47.812, M50.321]    1. Other cervical disc degeneration at C4-C5 level  Follow Up In Physical Therapy      2. Spondylosis without myelopathy or radiculopathy, cervical region  Follow Up In Physical Therapy      3. Neck stiffness        4. Neck pain                Subjective:  Pt stated he has more  mobility after visits.   The new lead vests he is required to wear at work are lighter, which helps.      Patient's Living Environment/PLOF: Pt lives with spouse and son.  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    Patient's Therapy Goals: to decrease stiffness, be able to move his neck better     Pain:  Intensity: 1-2/10            Location: entire cervical spine, bilaterally, right >> left            Duration: Constant            Aggravating Factor: prolonged positions            Alleviating Factor:  current with chiropractic, and LMT, his son has also been trying to help him stretch        Objective     Treatments:  Exercise:      Levator Scap stretch,  3 x 30 sec  Upper Trap Stretch, 3 x 30 sec    Manual:  Performed rotational grade IV mobs, bilaterally to upper cervical and lower segments C2-C7, side glides, (B), overpressure with max rotation into flexion, and PA mobs C2-T5 and adjacent ribs in spine, inferior glides for pain relief, suboccipital release, distraction, upper trap & lev scap stretching with overpressure, CTJ grade IV mobs (B)    Assessment:     Pt reports continued benefit of seeing chiro, MT and PT.   "Cervical motion is better, however, he stiffens up again after about 24-36 hrs though.  His typical posture is FHRS, guarded, stiff neck posturing, upper traps \"hiked\".  Manual performed as noted.  He reported less symptoms, more mobility post treatment. He will continue with visits up to his left shld surgery.    Plan: Continue per POC and pt tolerance    "

## 2024-02-22 ENCOUNTER — TREATMENT (OUTPATIENT)
Dept: PHYSICAL THERAPY | Facility: CLINIC | Age: 59
End: 2024-02-22
Payer: COMMERCIAL

## 2024-02-22 DIAGNOSIS — M47.812 SPONDYLOSIS WITHOUT MYELOPATHY OR RADICULOPATHY, CERVICAL REGION: ICD-10-CM

## 2024-02-22 DIAGNOSIS — M50.321 OTHER CERVICAL DISC DEGENERATION AT C4-C5 LEVEL: ICD-10-CM

## 2024-02-22 PROCEDURE — 97140 MANUAL THERAPY 1/> REGIONS: CPT | Mod: GP

## 2024-02-22 NOTE — PROGRESS NOTES
Physical Therapy Treatment Note     Patient Name: Arian Forde  MRN: [id number]  Today's Date: 2/22/2024     PT Therapeutic Procedures Time Entry  Manual Therapy Time Entry: 60  Visit: 6/12  Referred by: Nakul Meyer MD  [facility]   Certification Dates:  1-24-24 to 3-  Current Problem: {M47.812, M50.321]    1. Spondylosis without myelopathy or radiculopathy, cervical region  Follow Up In Physical Therapy      2. Other cervical disc degeneration at C4-C5 level  Follow Up In Physical Therapy              Subjective:  Pt stated he has more  mobility after visits, but he's usually pretty sore.       Patient's Living Environment/PLOF: Pt lives with spouse and son.  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    Patient's Therapy Goals: to decrease stiffness, be able to move his neck better     Pain:  Intensity: 1-2/10            Location: entire cervical spine, bilaterally, right >> l            Duration: Constant            Aggravating Factor: prolonged positions            Alleviating Factor:  current with chiropractic, and LMT, his son has also been trying to help him stretch        Objective     Cervical AROM: (degrees)   Flexion 38   Extension 28   Right Sidebend 16   Left Sidebend 14   Right Rotation 40   Left Rotation 45       Treatments:  Exercise:      Levator Scap stretch,  3 x 30 sec  Upper Trap Stretch, 3 x 30 sec    Manual:  Performed rotational grade IV mobs, bilaterally to upper cervical and lower segments C2-C7, side glides, (B), overpressure with max rotation into flexion, and PA mobs C2-T5 and adjacent ribs in spine, inferior glides for pain relief, suboccipital release, distraction, upper trap & lev scap stretching with overpressure, CTJ grade IV mobs (B)    Assessment:     Pt stated he saw the chiro prior to PT today.  .  Cervical motion is better, however, he stiffens up  "again after about 24-36 hrs though.  His typical posture is FHRS, guarded, stiff neck posturing, upper traps \"hiked\".  Manual performed as noted.  He reported less symptoms, more mobility post treatment.   Plan: Continue per POC and pt tolerance                  "

## 2024-02-27 ENCOUNTER — TREATMENT (OUTPATIENT)
Dept: PHYSICAL THERAPY | Facility: CLINIC | Age: 59
End: 2024-02-27
Payer: COMMERCIAL

## 2024-02-27 DIAGNOSIS — M47.812 SPONDYLOSIS WITHOUT MYELOPATHY OR RADICULOPATHY, CERVICAL REGION: ICD-10-CM

## 2024-02-27 DIAGNOSIS — M50.321 OTHER CERVICAL DISC DEGENERATION AT C4-C5 LEVEL: ICD-10-CM

## 2024-02-27 PROCEDURE — 97140 MANUAL THERAPY 1/> REGIONS: CPT | Mod: GP

## 2024-02-27 PROCEDURE — 97535 SELF CARE MNGMENT TRAINING: CPT | Mod: GP

## 2024-02-28 NOTE — PROGRESS NOTES
Physical Therapy Treatment Note     Patient Name: Arian Forde  MRN: [id number]  Today's Date: 2/27/2024     Time Calculation  Start Time: 1800  Stop Time: 1900  Time Calculation (min): 60 min  PT Therapeutic Procedures Time Entry  Manual Therapy Time Entry: 45  Self-Care/Home Mgmt Training: 15    Visit: 7/12  Referred by: Nakul Meyer MD  Insurance:  [facility]   Certification Dates:  1-24-24 to 3-  Current Problem: {M47.812, M50.321]    1. Spondylosis without myelopathy or radiculopathy, cervical region  Follow Up In Physical Therapy      2. Other cervical disc degeneration at C4-C5 level  Follow Up In Physical Therapy            Subjective:  Pt stated he was trying to stretch his neck hanging it over the edge of the bed & thinks he overdid it.  He is really sore today.      Patient's Living Environment/PLOF: Pt lives with spouse and son.  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    Patient's Therapy Goals: to decrease stiffness, be able to move his neck better     Pain:  Intensity: 1-2/10            Location: entire cervical spine, bilaterally, right >> lt            Duration: Constant            Aggravating Factor: prolonged positions            Alleviating Factor:  current with chiropractic, and LMT, his son has also been trying to help him stretch        Objective     Treatments:    Manual:  Performed rotational grade IV mobs, bilaterally to upper cervical and lower segments C2-C7, side glides, (B), overpressure with max rotation into flexion, and PA mobs C2-T5 and adjacent ribs in spine, inferior glides for pain relief, suboccipital release, distraction, upper trap & lev scap stretching with overpressure, CTJ grade IV mobs (B), inferior glides to T2 -T7, 1st rib mobs, and TPR to multiple areas Rt UT, lev scap    Education:  Wife present for manual stretching, she was educated on cervical  "distraction, occipital release, TPR and UT stretching to provide some carryover between clinic visits    Assessment:     Pt stated decreased stiffness post treatment.  Cervical motion is better, however, stiffness returns after about 24-36 hrs.  His typical posture is FHRS, guarded, stiff neck posturing, upper traps \"hiked\".  Manual performed as noted.  He reported less symptoms, more mobility post treatment. Discussed self mob with [employer]    Plan: Continue per POC and pt tolerance                                "

## 2024-02-29 ENCOUNTER — TREATMENT (OUTPATIENT)
Dept: PHYSICAL THERAPY | Facility: CLINIC | Age: 59
End: 2024-02-29
Payer: COMMERCIAL

## 2024-02-29 DIAGNOSIS — M47.812 SPONDYLOSIS WITHOUT MYELOPATHY OR RADICULOPATHY, CERVICAL REGION: ICD-10-CM

## 2024-02-29 DIAGNOSIS — M50.321 OTHER CERVICAL DISC DEGENERATION AT C4-C5 LEVEL: ICD-10-CM

## 2024-02-29 PROCEDURE — 97140 MANUAL THERAPY 1/> REGIONS: CPT | Mod: GP

## 2024-02-29 NOTE — PROGRESS NOTES
Physical Therapy Treatment Note       Patient Name: Arian Forde  MRN: [id number]  Today's Date: 2/29/2024     Time Calculation  Start Time: 1000  Stop Time: 1045  Time Calculation (min): 45 min  PT Therapeutic Procedures Time Entry  Manual Therapy Time Entry: 43  Visit: 8/12  Referred by: Nakul Meyer MD    [facility]   Certification Dates:  1-24-24 to 3-  Current Problem: {M47.812, M50.321]    1. Spondylosis without myelopathy or radiculopathy, cervical region  Follow Up In Physical Therapy      2. Other cervical disc degeneration at C4-C5 level  Follow Up In Physical Therapy            Subjective:  Pt stated he was really sore after visits, but soreness is less now.  He usually ices after visits.      Patient's Living Environment/PLOF: Pt lives with spouse and son.  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      Patient's Therapy Goals: to decrease stiffness, be able to move his neck better     Pain:  Intensity: 1-2/10            Location: entire cervical spine, bilaterally, Rt >> Lt            Duration: Constant            Aggravating Factor: prolonged positions            Alleviating Factor:  current with chiropractic, and LMT, his son has also been trying to help him stretch        Objective     Treatments:    Manual:  Performed rotational grade IV mobs, bilaterally to upper cervical and lower segments C2-C7, side glides, (B), overpressure with max rotation into flexion, and PA mobs C2-T5 and adjacent ribs in spine, inferior glides for pain relief, suboccipital release, distraction, upper trap & lev scap stretching with overpressure, CTJ grade IV mobs (B), inferior glides to T2 -T7, 1st rib mobs, and TPR to multiple areas Rt UT, lev scap, distraction in sitting with max rotation (B)    Assessment:     Pt stated he has more mobility, motion is easier.  He was not as stiff when he came  "into the clinic this morning.  His typical posture is FHRS, guarded, stiff neck posturing, upper traps \"hiked\".  He stated the lead vest he is required to wear for \"cases\"at work is heavy & he leans over during the procedure to observe, further exacerbating posture related issues.  Manual performed as noted.  He reported less symptoms, more mobility post treatment.     Plan: Continue per POC and pt tolerance                                              "

## 2024-03-05 ENCOUNTER — TREATMENT (OUTPATIENT)
Dept: PHYSICAL THERAPY | Facility: CLINIC | Age: 59
End: 2024-03-05
Payer: COMMERCIAL

## 2024-03-05 DIAGNOSIS — M50.321 OTHER CERVICAL DISC DEGENERATION AT C4-C5 LEVEL: Primary | ICD-10-CM

## 2024-03-05 DIAGNOSIS — M47.812 CERVICAL SPONDYLOSIS WITHOUT MYELOPATHY: ICD-10-CM

## 2024-03-05 DIAGNOSIS — M47.812 SPONDYLOSIS WITHOUT MYELOPATHY OR RADICULOPATHY, CERVICAL REGION: ICD-10-CM

## 2024-03-05 DIAGNOSIS — M50.321 DEGENERATION OF INTERVERTEBRAL DISC AT C4-C5 LEVEL: ICD-10-CM

## 2024-03-05 PROCEDURE — 97140 MANUAL THERAPY 1/> REGIONS: CPT | Mod: GP

## 2024-03-05 NOTE — PROGRESS NOTES
Physical Therapy Treatment Note       Patient Name: Arian Forde  MRN: [id number]  Today's Date:  3-5-24     Time Calculation  Start Time: 1800  Stop Time: 1900  Time Calculation (min): 60 min  PT Therapeutic Procedures Time Entry  Manual Therapy Time Entry: 58  Visit: 9/12  Referred by: Nakul Meyer MD    [facility]   Certification Dates:  1-24-24 to 3-  Current Problem: {M47.812, M50.321]    1. Other cervical disc degeneration at C4-C5 level  Follow Up In Physical Therapy      2. Spondylosis without myelopathy or radiculopathy, cervical region  Follow Up In Physical Therapy            Subjective:  Pt stated his neck did not stiffen back up til today from his visit last week.      Patient's Living Environment/PLOF: Pt lives with spouse and son.  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      Patient's Therapy Goals: to decrease stiffness, be able to move his neck better     Pain:  Intensity: 1-2/10            Location: entire cervical spine, bilaterally, Rt >> Lt            Duration: Constant            Aggravating Factor: prolonged positions            Alleviating Factor:  current with chiropractic, and LMT, his son has also been trying to help him stretch        Objective     Treatments:    Manual:  Performed rotational grade IV mobs, bilaterally to upper cervical and lower segments C2-C7, side glides, (B), overpressure with max rotation into flexion, and PA mobs C2-T5 and adjacent ribs in spine, inferior glides for pain relief, suboccipital release, distraction, upper trap & lev scap stretching with overpressure, CTJ grade IV mobs (B), inferior glides to T2 -T7, 1st rib mobs, and TPR to multiple areas Rt UT, lev scap, distraction in sitting with max rotation (B), pt in mutilpe positions (supine, prone and sitting)    Assessment:     Pt stated he is moving better with longer periods in  between clinic visits prior to stiffening back up in the neck and upper thoracic areas.   He also stated it has helped that he hasn't worn the lead vest over the last several days.  Manual performed as noted.  He reported less symptoms, more mobility post treatment. Multiple cavitations in multiple segments today.    Plan: Continue per POC and pt tolerance

## 2024-03-07 ENCOUNTER — TREATMENT (OUTPATIENT)
Dept: PHYSICAL THERAPY | Facility: CLINIC | Age: 59
End: 2024-03-07
Payer: COMMERCIAL

## 2024-03-07 DIAGNOSIS — M50.321 OTHER CERVICAL DISC DEGENERATION AT C4-C5 LEVEL: ICD-10-CM

## 2024-03-07 DIAGNOSIS — M47.812 SPONDYLOSIS WITHOUT MYELOPATHY OR RADICULOPATHY, CERVICAL REGION: ICD-10-CM

## 2024-03-07 DIAGNOSIS — M47.812 CERVICAL SPONDYLOSIS WITHOUT MYELOPATHY: ICD-10-CM

## 2024-03-07 DIAGNOSIS — M50.321 DEGENERATION OF INTERVERTEBRAL DISC AT C4-C5 LEVEL: ICD-10-CM

## 2024-03-07 PROCEDURE — 97140 MANUAL THERAPY 1/> REGIONS: CPT | Mod: GP

## 2024-03-07 NOTE — PROGRESS NOTES
Physical Therapy Treatment Note       Patient Name: Arian Forde  MRN: [id number]  Today's Date:  3-7-24     Time Calculation  Start Time: 1725  Stop Time: 1825  Time Calculation (min): 60 min  PT Therapeutic Procedures Time Entry  Manual Therapy Time Entry: 60  Visit: 10/12  Referred by: Nakul Meyer MD    Insurance:  [facility]   Certification Dates:  1-24-24 to 3-  Current Problem: {M47.812, M50.321]    1. Spondylosis without myelopathy or radiculopathy, cervical region  Follow Up In Physical Therapy      2. Other cervical disc degeneration at C4-C5 level  Follow Up In Physical Therapy            Subjective:  Pt stated his neck is not as stiff today.      Patient's Living Environment/PLOF: Pt lives with spouse and son.  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      Patient's Therapy Goals: to decrease stiffness, be able to move his neck better     Pain:  Intensity: 1-2/10            Location: entire cervical spine, bilaterally, Rt >> Lt            Duration: Constant            Aggravating Factor: prolonged positions            Alleviating Factor:  current with chiropractic, and LMT, his son has also been trying to help him stretch        Objective     Treatments:    Manual:  Performed rotational grade IV mobs, bilaterally to upper cervical and lower segments C2-C7, side glides, (B), overpressure with max rotation into flexion, and PA mobs C2-T5 and adjacent ribs in spine, inferior glides for pain relief, suboccipital release, distraction, upper trap & lev scap stretching with overpressure, CTJ grade IV mobs (B), inferior glides to T2 -T7, 1st rib mobs, and TPR to multiple areas Rt UT, lev scap, distraction in sitting with max rotation (B), pt in mutilpe positions (supine, prone and sitting) as per previous sessions    Assessment:     Pt stated he is moving better with longer periods in between clinic  visits prior to stiffening back up in the neck and upper thoracic areas.   He also stated it has helped that he hasn't worn the lead vest over the last several days.  Manual performed as noted.  He reported less symptoms, more mobility post treatment. Multiple cavitations in multiple segments today.    Plan: Continue per POC and pt tolerance    GOALS:  Active       PT Problem       PT Goal 1 (Progressing)       Start:  01/24/24    Expected End:  03/20/24       Pt independent with HEP and consistent for optimal recovery and self-management after DC from PT.             PT Goal 2 (Met)       Start:  01/24/24    Expected End:  03/20/24    Resolved:  03/07/24    Pt will decrease neck pain to < or = 2-3/10 to improve tolerance to ADL's, work         PT Goal 3 (Progressing)       Start:  01/24/24    Expected End:  03/20/24       Pt will decrease NDI to < or = 20% to demonstrate functional improvement.           PT Goal 4 (Progressing)       Start:  01/24/24    Expected End:  03/20/24       Pt will improve cervical AROM to > or = 3/4 AROM all planes to improve functional mobility, decrease stiffness

## 2024-03-12 ENCOUNTER — TREATMENT (OUTPATIENT)
Dept: PHYSICAL THERAPY | Facility: CLINIC | Age: 59
End: 2024-03-12
Payer: COMMERCIAL

## 2024-03-12 DIAGNOSIS — M50.321 OTHER CERVICAL DISC DEGENERATION AT C4-C5 LEVEL: ICD-10-CM

## 2024-03-12 DIAGNOSIS — M50.321 DEGENERATION OF INTERVERTEBRAL DISC AT C4-C5 LEVEL: ICD-10-CM

## 2024-03-12 DIAGNOSIS — M47.812 CERVICAL SPONDYLOSIS WITHOUT MYELOPATHY: ICD-10-CM

## 2024-03-12 DIAGNOSIS — M47.812 SPONDYLOSIS WITHOUT MYELOPATHY OR RADICULOPATHY, CERVICAL REGION: ICD-10-CM

## 2024-03-12 PROCEDURE — 97140 MANUAL THERAPY 1/> REGIONS: CPT | Mod: GP

## 2024-03-13 NOTE — PROGRESS NOTES
Physical Therapy Treatment Note       Patient Name: Arian Forde  MRN: [id number]  Today's Date:  3-12-24     Time Calculation  Start Time: 1715  Stop Time: 1815  Time Calculation (min): 60 min  PT Therapeutic Procedures Time Entry  Manual Therapy Time Entry: 55  Visit: 11/12  Referred by: Nakul Meyer MD    [facility]   Certification Dates:  1-24-24 to 3-  Current Problem: {M47.812, M50.321]    1. Spondylosis without myelopathy or radiculopathy, cervical region  Follow Up In Physical Therapy      2. Other cervical disc degeneration at C4-C5 level  Follow Up In Physical Therapy            Subjective:  Pt stated his neck is not as stiff, but he still has difficulty moving it .      Patient's Living Environment/PLOF: Pt lives with spouse and son.  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, he sees a LMT about 1 x month, she performs deep tissue on the right side of my neck, he's also current with a chiroprator    Patient's Therapy Goals: to decrease stiffness, be able to move his neck better     Pain:  Intensity: 1-2/10            Location: entire cervical spine, bilaterally, Rt >> Lt            Duration: Constant            Aggravating Factor: prolonged positions            Alleviating Factor:  current with chiropractic, and LMT, his son & wife have also been trying to help him stretch        Objective     Treatments:    Manual:  Performed rotational grade IV mobs, bilaterally to upper cervical and lower segments C2-C7, side glides, (B), overpressure with max rotation into flexion, and PA mobs C2-T5 and adjacent ribs in supine, inferior glides for pain relief, suboccipital release, distraction, upper trap & lev scap stretching with overpressure, CTJ grade IV mobs (B), inferior glides to T2 -T7, 1st rib mobs, and TPR to multiple areas Rt UT, lev scap, distraction in sitting with max rotation (B), pt in mutilpe positions (supine, prone and sitting) as per previous  sessions    Assessment:     Pt stated he is moving better and his ROM measurements have improved.  However, he still rotates with his trunk due to compensatory movement.  Manual performed as noted.  + cavitations noted today.  He has pain bilaterally C2C3 and corresponding subocciptals.  I was able to get the left side to cavitate.  He reported less symptoms, more mobility post treatment. He would like to continue with PT up to his surgery on April 5th.  He is afraid the neck will stiffen back up after surgery.      Plan: Continue per POC and pt tolerance, extend POC next visit.

## 2024-03-14 ENCOUNTER — APPOINTMENT (OUTPATIENT)
Dept: PHYSICAL THERAPY | Facility: CLINIC | Age: 59
End: 2024-03-14
Payer: COMMERCIAL

## 2024-03-14 ENCOUNTER — TREATMENT (OUTPATIENT)
Dept: PHYSICAL THERAPY | Facility: CLINIC | Age: 59
End: 2024-03-14
Payer: COMMERCIAL

## 2024-03-14 DIAGNOSIS — M47.812 SPONDYLOSIS WITHOUT MYELOPATHY OR RADICULOPATHY, CERVICAL REGION: ICD-10-CM

## 2024-03-14 DIAGNOSIS — M43.6 NECK STIFFNESS: ICD-10-CM

## 2024-03-14 DIAGNOSIS — M50.321 DEGENERATION OF INTERVERTEBRAL DISC AT C4-C5 LEVEL: ICD-10-CM

## 2024-03-14 DIAGNOSIS — M47.812 CERVICAL SPONDYLOSIS WITHOUT MYELOPATHY: ICD-10-CM

## 2024-03-14 DIAGNOSIS — M50.321 OTHER CERVICAL DISC DEGENERATION AT C4-C5 LEVEL: Primary | ICD-10-CM

## 2024-03-14 PROCEDURE — 97140 MANUAL THERAPY 1/> REGIONS: CPT | Mod: GP

## 2024-03-14 NOTE — PROGRESS NOTES
Physical Therapy Treatment Note       Patient Name: Arian Forde  MRN: [id number]  Today's Date:  3-14-24     Time Calculation  Start Time: 1630  Stop Time: 1715  Time Calculation (min): 45 min  PT Therapeutic Procedures Time Entry  Manual Therapy Time Entry: 45  Visit: 12/12  Referred by: Nakul Meyer MD    [facility]   Certification Dates:  1-24-24 to 3-  Current Problem: {M47.812, M50.321]    1. Other cervical disc degeneration at C4-C5 level        2. Spondylosis without myelopathy or radiculopathy, cervical region        3. Neck stiffness              Subjective:  Pt stated his neck is not as stiff, but he still has difficulty moving it .      Patient's Living Environment/PLOF: Pt lives with spouse and son.  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, he sees a LMT about 1 x month, she performs deep tissue on the right side of my neck, he's also current with a chiroprator    Patient's Therapy Goals: to decrease stiffness, be able to move his neck better     Pain:  Intensity: 1-2/10,5/10 after therapy it's sore            Location: entire cervical spine, bilaterally, Rt >> Lt            Duration: Constant            Aggravating Factor: prolonged positions            Alleviating Factor:  current with chiropractic, and LMT, his son & wife have also been trying to help him stretch        Objective     Treatments:    Manual:  Performed rotational grade IV mobs, bilaterally to upper cervical and lower segments C2-C7, side glides, (B), overpressure with max rotation into flexion, and PA mobs C2-T5 and adjacent ribs in supine, inferior glides for pain relief, suboccipital release, distraction, upper trap & lev scap stretching with overpressure, CTJ grade IV mobs (B), inferior glides to T2 -T7, 1st rib mobs, and TPR to multiple areas Rt UT, lev scap, distraction in sitting with max rotation (B), pt in mutilpe positions (supine, prone and sitting) as per previous sessions, pt supine  on 1/2 round so thoracic spine in Extension, towel on chin for distraction & moved into extension    Assessment:     Pt stated he is moving better and his ROM measurements have improved.  Between the chiro, MT & PT, he is moving better.  Manual performed as noted.  + cavitations noted in multiple joints.  He has pain bilaterally C2-C3 and corresponding subocciptals.  I was able to get (B) sides to cavitate at this level today.  He reported chiro has also been able to cavitate more joints, where he was not able to previously.   He stated he is now able to see peripherally when he rotates while driving.  He would like to continue with PT up to his surgery on April 5th.  He is afraid the neck will stiffen back up after surgery.      Plan: Continue per POC and pt tolerance, extend POC next visit.

## 2024-03-18 ENCOUNTER — TREATMENT (OUTPATIENT)
Dept: PHYSICAL THERAPY | Facility: CLINIC | Age: 59
End: 2024-03-18
Payer: COMMERCIAL

## 2024-03-18 DIAGNOSIS — M50.321 DEGENERATION OF INTERVERTEBRAL DISC AT C4-C5 LEVEL: ICD-10-CM

## 2024-03-18 DIAGNOSIS — M47.812 CERVICAL SPONDYLOSIS WITHOUT MYELOPATHY: ICD-10-CM

## 2024-03-18 DIAGNOSIS — M47.812 SPONDYLOSIS WITHOUT MYELOPATHY OR RADICULOPATHY, CERVICAL REGION: ICD-10-CM

## 2024-03-18 DIAGNOSIS — M43.6 NECK STIFFNESS: ICD-10-CM

## 2024-03-18 DIAGNOSIS — M50.321 OTHER CERVICAL DISC DEGENERATION AT C4-C5 LEVEL: Primary | ICD-10-CM

## 2024-03-18 PROCEDURE — 97140 MANUAL THERAPY 1/> REGIONS: CPT | Mod: GP

## 2024-03-18 NOTE — PROGRESS NOTES
Physical Therapy Treatment Note       Patient Name: Arian Forde  MRN: [id number]  Today's Date:  3-18-24     Time Calculation  Start Time: 0700  Stop Time: 0800  Time Calculation (min): 60 min  PT Therapeutic Procedures Time Entry  Manual Therapy Time Entry: 60  Visit: 13/18  Referred by: Nakul Meyer MD    [facility]   Certification Dates:  1-24-24 to 3-  Current Problem: {M47.812, M50.321]    1. Other cervical disc degeneration at C4-C5 level        2. Spondylosis without myelopathy or radiculopathy, cervical region        3. Neck stiffness              Subjective:  Pt stated his neck is not as stiff, he's happy with progress to date, but there's still room to improve.       Patient's Living Environment/PLOF: Pt lives with spouse and son.  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, he sees a LMT about 1 x month, she performs deep tissue on the right side of my neck, he's also current with a chiroprator, he is currently limited with heavy lifting, OH lifting and reaching due to a Left RTCT (4-5-24 surgery)    Patient's Therapy Goals: to decrease stiffness, be able to move his neck better     Pain:  Intensity: 1-2/10, 4-5/10 after therapy it's sore, he has to ice it            Location: entire cervical spine, bilaterally, Rt >> Lt, C3-C7            Duration: dull ache intermittent            Aggravating Factor: prolonged positions, 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            Alleviating Factor:  current with chiropractic, and LMT, his son & wife have also been trying to help him stretch    Precautions:  Left shoulder due to Lt RTCT        Objective     Treatments:    Manual:  Performed rotational grade IV mobs, bilaterally to upper cervical and lower segments C2-C7, side glides, (B), overpressure with max rotation into flexion, and PA mobs C2-T5 and adjacent ribs in supine, inferior glides for pain relief, suboccipital release, distraction, upper trap & lev scap stretching  "with overpressure, CTJ grade IV mobs (B), inferior glides to T2 -T7, 1st rib mobs, and TPR to multiple areas Rt UT, lev scap, distraction in sitting with max rotation (B), pt in mutilpe positions (supine, prone and sitting) as per previous sessions, pt supine on 1/2 round so thoracic spine in Extension, towel on chin for distraction & moved into extension    Assessment:     Pt stated he is moving better and his ROM measurements have improved.  Manual performed as noted.  + cavitations noted in multiple joints.  I was able to get (B) multiple areas to cavitate today.  He reports being able to see peripherally when he rotates while driving now.  He would like to continue with PT up to his surgery on April 5th.  He is afraid the neck will stiffen back up after surgery.  This is the \"loosest\" that's he been in a long while.     Plan:  POC good until 3-21-24. Will extend out until 4-5-24, adding 6 additional visits for a total of 18 visits.  Continue with mobility prior to upcoming surgery.                                                     "

## 2024-03-19 ENCOUNTER — PRE-ADMISSION TESTING (OUTPATIENT)
Dept: PREADMISSION TESTING | Facility: HOSPITAL | Age: 59
End: 2024-03-19
Payer: COMMERCIAL

## 2024-03-19 VITALS
BODY MASS INDEX: 29.79 KG/M2 | OXYGEN SATURATION: 96 % | DIASTOLIC BLOOD PRESSURE: 82 MMHG | TEMPERATURE: 98.8 F | HEIGHT: 68 IN | WEIGHT: 196.54 LBS | HEART RATE: 80 BPM | RESPIRATION RATE: 12 BRPM | SYSTOLIC BLOOD PRESSURE: 132 MMHG

## 2024-03-19 DIAGNOSIS — Z01.818 PREOPERATIVE TESTING: Primary | ICD-10-CM

## 2024-03-19 LAB
ANION GAP SERPL CALC-SCNC: 11 MMOL/L (ref 10–20)
BASOPHILS # BLD AUTO: 0.07 X10*3/UL (ref 0–0.1)
BASOPHILS NFR BLD AUTO: 0.9 %
BUN SERPL-MCNC: 19 MG/DL (ref 6–23)
CALCIUM SERPL-MCNC: 9.2 MG/DL (ref 8.6–10.3)
CHLORIDE SERPL-SCNC: 104 MMOL/L (ref 98–107)
CO2 SERPL-SCNC: 27 MMOL/L (ref 21–32)
CREAT SERPL-MCNC: 0.94 MG/DL (ref 0.5–1.3)
EGFRCR SERPLBLD CKD-EPI 2021: >90 ML/MIN/1.73M*2
EOSINOPHIL # BLD AUTO: 0.11 X10*3/UL (ref 0–0.7)
EOSINOPHIL NFR BLD AUTO: 1.5 %
ERYTHROCYTE [DISTWIDTH] IN BLOOD BY AUTOMATED COUNT: 13 % (ref 11.5–14.5)
GLUCOSE SERPL-MCNC: 79 MG/DL (ref 74–99)
HCT VFR BLD AUTO: 43.3 % (ref 41–52)
HGB BLD-MCNC: 14.1 G/DL (ref 13.5–17.5)
IMM GRANULOCYTES NFR BLD AUTO: 0.1 % (ref 0–0.9)
LYMPHOCYTES # BLD AUTO: 2.08 X10*3/UL (ref 1.2–4.8)
LYMPHOCYTES NFR BLD AUTO: 27.7 %
MCHC RBC AUTO-ENTMCNC: 32.6 G/DL (ref 32–36)
MCV RBC AUTO: 90 FL (ref 80–100)
MONOCYTES # BLD AUTO: 0.51 X10*3/UL (ref 0.1–1)
MONOCYTES NFR BLD AUTO: 6.8 %
NEUTROPHILS # BLD AUTO: 4.74 X10*3/UL (ref 1.2–7.7)
NEUTROPHILS NFR BLD AUTO: 63 %
PLATELET # BLD AUTO: 247 X10*3/UL (ref 150–450)
POTASSIUM SERPL-SCNC: 3.8 MMOL/L (ref 3.5–5.3)
RBC # BLD AUTO: 4.8 X10*6/UL (ref 4.5–5.9)
SODIUM SERPL-SCNC: 138 MMOL/L (ref 136–145)
WBC # BLD AUTO: 7.5 X10*3/UL (ref 4.4–11.3)

## 2024-03-19 PROCEDURE — 80048 BASIC METABOLIC PNL TOTAL CA: CPT

## 2024-03-19 PROCEDURE — 36415 COLL VENOUS BLD VENIPUNCTURE: CPT

## 2024-03-19 PROCEDURE — 99203 OFFICE O/P NEW LOW 30 MIN: CPT | Performed by: NURSE PRACTITIONER

## 2024-03-19 PROCEDURE — 85025 COMPLETE CBC W/AUTO DIFF WBC: CPT

## 2024-03-19 RX ORDER — NAPROXEN 250 MG/1
250 TABLET ORAL
COMMUNITY
End: 2024-04-05 | Stop reason: HOSPADM

## 2024-03-19 ASSESSMENT — PAIN SCALES - GENERAL: PAINLEVEL_OUTOF10: 0 - NO PAIN

## 2024-03-19 ASSESSMENT — ENCOUNTER SYMPTOMS
PSYCHIATRIC NEGATIVE: 1
ENDOCRINE NEGATIVE: 1
NEUROLOGICAL NEGATIVE: 1
RESPIRATORY NEGATIVE: 1
NECK PAIN: 1
GASTROINTESTINAL NEGATIVE: 1
CONSTITUTIONAL NEGATIVE: 1
HEMATOLOGIC/LYMPHATIC NEGATIVE: 1
EYES NEGATIVE: 1
ALLERGIC/IMMUNOLOGIC NEGATIVE: 1
CARDIOVASCULAR NEGATIVE: 1

## 2024-03-19 ASSESSMENT — PAIN - FUNCTIONAL ASSESSMENT: PAIN_FUNCTIONAL_ASSESSMENT: 0-10

## 2024-03-19 NOTE — PREPROCEDURE INSTRUCTIONS
Medication List            Accurate as of March 19, 2024  4:01 PM. Always use your most recent med list.                naproxen 250 mg tablet  Commonly known as: Naprosyn  Medication Adjustments for Surgery: Stop 7 days before surgery     tiZANidine 4 mg tablet  Commonly known as: Zanaflex  Medication Adjustments for Surgery: Other (Comment)  Notes to patient: As needed                              NPO Instructions:    Do not eat any food after midnight the night before your surgery/procedure.    Additional Instructions:     Seven/Six Days before Surgery:  Review your medication instructions, stop indicated medications  Five Days before Surgery:  Review your medication instructions, stop indicated medications  Three Days before Surgery:  Review your medication instructions, stop indicated medications  The Day before Surgery:  Review your medication instructions, stop indicated medications  You will be contacted regarding the time of your arrival to facility and surgery time  Do not eat any food after Midnight  Day of Surgery:  Review your medication instructions, take indicated medications  Wear  comfortable loose fitting clothing  Do not use moisturizers, creams, lotions or perfume  All jewelry and valuables should be left at home

## 2024-03-19 NOTE — H&P (VIEW-ONLY)
CPM/PAT Evaluation     Arian Forde is a 58 y.o. male   Chief Complaint: having shoulder surgery    HPI:  Patient is a 57 y/o alert and oriented male coming in for PAT for a scheduled Left Shoulder arthroscopy, rotator cuff repair, intra-articular debridement and synovectomy, subacromial decompression and acromioplasty and open subpectoral biceps tenodesis   On 4/5/2024 w/ Dr. Mukherjee.    The patient reports intermittent shoulder pain at biceps groove with movement lennox when reaching overhand.  Use can exacerbate the pain as well as lifting.  He does have some slight left arm weakness. No numbness, tingling or decreased rom.  He states ice and aleve help.  Patient denies chest pain, SOB, SEWELL and NVDC.    Patient also denies Hx: DVT/PE.    Current medications were reviewed and a presurgical mediation schedule was provided.    He has no questions at this time.   Past Medical History:   Diagnosis Date    Allergic rhinitis     Laryngopharyngeal reflux       Past Surgical History:   Procedure Laterality Date    BACK SURGERY      BICEPS TENDON REPAIR      NASAL SEPTUM SURGERY      TONSILLECTOMY          No Known Allergies     Current Outpatient Medications on File Prior to Visit   Medication Sig Dispense Refill    naproxen (Naprosyn) 250 mg tablet Take 1 tablet (250 mg) by mouth 2 times a day with meals.      tiZANidine (Zanaflex) 4 mg tablet Take 1 tablet (4 mg) by mouth as needed at bedtime.       No current facility-administered medications on file prior to visit.       Review of Systems   Constitutional: Negative.    HENT: Negative.     Eyes: Negative.    Respiratory: Negative.     Cardiovascular: Negative.    Gastrointestinal: Negative.    Endocrine: Negative.    Genitourinary: Negative.    Musculoskeletal:  Positive for neck pain.        Neck pain   Skin: Negative.    Allergic/Immunologic: Negative.    Neurological: Negative.    Hematological: Negative.    Psychiatric/Behavioral: Negative.        Physical  Exam  Vitals and nursing note reviewed.   Constitutional:       Appearance: Normal appearance.   HENT:      Head: Normocephalic and atraumatic.      Mouth/Throat:      Mouth: Mucous membranes are moist.      Pharynx: Oropharynx is clear.   Eyes:      Pupils: Pupils are equal, round, and reactive to light.   Neck:      Comments: Decreased rom to cervical spine  Cardiovascular:      Rate and Rhythm: Normal rate and regular rhythm.      Heart sounds: Normal heart sounds.   Pulmonary:      Effort: Pulmonary effort is normal.      Breath sounds: Normal breath sounds.   Abdominal:      General: Bowel sounds are normal.      Palpations: Abdomen is soft.   Musculoskeletal:         General: Normal range of motion.   Skin:     General: Skin is warm and dry.   Neurological:      General: No focal deficit present.      Mental Status: He is alert and oriented to person, place, and time.   Psychiatric:         Mood and Affect: Mood normal.         Behavior: Behavior normal.         Thought Content: Thought content normal.         Judgment: Judgment normal.        PAT AIRWAY:   Airway:     Mallampati::  III    TM distance::  >3 FB    Neck ROM::  Full    Has dental crowns  Does not smoke  Has 2 drinks a week  No drug use  No issues with anesthesia  No family issues with anesthesia    Assessment and Plan:   Nontraumatic complete tear of left rotator cuff, degenerative SLAOP tear of left shoulder, Shoulder Impingement - left, Biceps Tendinitis - left  Left Shoulder arthroscopy, rotator cuff repair, intra-articular debridement and synovectomy, subacromial decompression and acromioplasty and open subpectoral biceps tenodesis  Managed with naproxen 1 tablet bid  Managed with tizanidine 4mg at bedtime prn    ASA II  RCRI - 0 points  Class I Risk 3.9%  BOB - 4 points moderate Risk for KELSY   NSQIP - Predicted length of stay 0 days  ARISCAT - 3 points Low Risk 1.6%  DASI 36.7 Points 7.25 Mets  JAMAAL - 0.0%  JHFRAT -0 points no risk for  falls  Clearance - pcp in chart  PAT Testing - CBC, BMP    Face to Face patient contact time 30 minutes    Elsa Lacy, APRN-CNP 3/19/2024 4:15 PM  Results for orders placed or performed in visit on 03/19/24 (from the past 24 hour(s))   Basic Metabolic Panel   Result Value Ref Range    Glucose 79 74 - 99 mg/dL    Sodium 138 136 - 145 mmol/L    Potassium 3.8 3.5 - 5.3 mmol/L    Chloride 104 98 - 107 mmol/L    Bicarbonate 27 21 - 32 mmol/L    Anion Gap 11 10 - 20 mmol/L    Urea Nitrogen 19 6 - 23 mg/dL    Creatinine 0.94 0.50 - 1.30 mg/dL    eGFR >90 >60 mL/min/1.73m*2    Calcium 9.2 8.6 - 10.3 mg/dL   CBC and Auto Differential   Result Value Ref Range    WBC 7.5 4.4 - 11.3 x10*3/uL    RBC 4.80 4.50 - 5.90 x10*6/uL    Hemoglobin 14.1 13.5 - 17.5 g/dL    Hematocrit 43.3 41.0 - 52.0 %    MCV 90 80 - 100 fL    MCHC 32.6 32.0 - 36.0 g/dL    RDW 13.0 11.5 - 14.5 %    Platelets 247 150 - 450 x10*3/uL    Neutrophils % 63.0 40.0 - 80.0 %    Immature Granulocytes %, Automated 0.1 0.0 - 0.9 %    Lymphocytes % 27.7 13.0 - 44.0 %    Monocytes % 6.8 2.0 - 10.0 %    Eosinophils % 1.5 0.0 - 6.0 %    Basophils % 0.9 0.0 - 2.0 %    Neutrophils Absolute 4.74 1.20 - 7.70 x10*3/uL    Lymphocytes Absolute 2.08 1.20 - 4.80 x10*3/uL    Monocytes Absolute 0.51 0.10 - 1.00 x10*3/uL    Eosinophils Absolute 0.11 0.00 - 0.70 x10*3/uL    Basophils Absolute 0.07 0.00 - 0.10 x10*3/uL

## 2024-03-20 ENCOUNTER — TREATMENT (OUTPATIENT)
Dept: PHYSICAL THERAPY | Facility: CLINIC | Age: 59
End: 2024-03-20
Payer: COMMERCIAL

## 2024-03-20 ENCOUNTER — APPOINTMENT (OUTPATIENT)
Dept: PHYSICAL THERAPY | Facility: CLINIC | Age: 59
End: 2024-03-20
Payer: COMMERCIAL

## 2024-03-20 DIAGNOSIS — M50.321 DEGENERATION OF INTERVERTEBRAL DISC AT C4-C5 LEVEL: ICD-10-CM

## 2024-03-20 DIAGNOSIS — M47.812 CERVICAL SPONDYLOSIS WITHOUT MYELOPATHY: ICD-10-CM

## 2024-03-20 DIAGNOSIS — M43.6 NECK STIFFNESS: ICD-10-CM

## 2024-03-20 DIAGNOSIS — M47.812 SPONDYLOSIS WITHOUT MYELOPATHY OR RADICULOPATHY, CERVICAL REGION: ICD-10-CM

## 2024-03-20 DIAGNOSIS — M50.321 OTHER CERVICAL DISC DEGENERATION AT C4-C5 LEVEL: Primary | ICD-10-CM

## 2024-03-20 PROCEDURE — 97140 MANUAL THERAPY 1/> REGIONS: CPT | Mod: GP

## 2024-03-20 NOTE — LETTER
March 20, 2024    Nakul Meyer MD  [address] [facility] [place] [address]    Patient: Arian Forde   YOB: 1965   Date of Visit: 3/20/2024       Dear Nakul Meyer MD  [address] [facility] [place] [address]    The attached plan of care is being sent to you because your patient’s medical reimbursement requires that you certify the plan of care. Your signature is required to allow uninterrupted insurance coverage.      You may indicate your approval by signing below and faxing this form back to us at Dept Fax: [phone].    Please call Dept: [phone] with any questions or concerns.    Thank you for this referral,        Stephania Louie , PT  [address] [address]    Payer: Payor: ANTHEM / Plan: ANTHEM HMP / Product Type: *No Product type* /                                                                         Date:     Dear Stephania Louie , PT,     Re: Mr. Arian Forde, MRN:[id number]    I certify that I have reviewed the attached plan of care and it is medically necessary for Mr. Arian Forde (1965) who is under my care.          ______________________________________                    _________________  Provider name and credentials                                           Date and time                                                                                           Plan of Care 3/22/24   Effective from: 3/22/2024  Effective to: 4/4/2024    Plan ID: [id number]            Participants as of Finalize on 3/20/2024    Name Type Comments Contact Info    Nakul Meyer MD PCP - General Please sign POC.  [phone]    Stephanai Louie PT Physical Therapist  [phone]       Last Plan Note     Author: Stephania Louie PT Status: Sign when Signing Visit Last edited: 3/20/2024  2:00 PM           Physical Therapy Treatment Note       Patient Name:  Arian Forde  MRN: [id number]  Today's Date:  3-20-24     Time Calculation  Start Time: 1400  Stop Time: 1500  Time Calculation (min): 60 min  PT Therapeutic Procedures Time Entry  Manual Therapy Time Entry: 60  Visit: 14/18  Referred by: Nakul Meyer MD    Insurance:  [facility]   Certification Dates:  1-24-24 to 3-  Current Problem: {M47.812, M50.321]    1. Other cervical disc degeneration at C4-C5 level        2. Spondylosis without myelopathy or radiculopathy, cervical region        3. Neck stiffness        4. Cervical spondylosis without myelopathy  Follow Up In Physical Therapy      5. Degeneration of intervertebral disc at C4-C5 level  Follow Up In Physical Therapy            Subjective:  Pt stated his neck is stiff today, he just came from a case, he had 50 lbs of lead on.  He also jacked up his back last weekend, so he's moving very gingerly.         Patient's Living Environment/PLOF: Pt lives with spouse and son.  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, he sees a LMT about 1 x month, she performs deep tissue on the right side of my neck, he's also current with a chiroprator, he is currently limited with heavy lifting, OH lifting and reaching due to a Left RTCT (4-5-24 surgery)    Patient's Therapy Goals: to decrease stiffness, be able to move his neck better prior to his upcoming left shld surgery     Pain:  Intensity: 1-2/10, 4-5/10 after therapy it's sore, he has to ice it            Location: entire cervical spine, (B), Rt >> Lt, C3-C7            Duration: dull ache intermittent            Aggravating Factor: prolonged positions, 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            Alleviating Factor:  current with chiropractic, and LMT, his son & wife have also been trying to help him stretch    Precautions:  Left shoulder due to Lt RTCT        Objective     Treatments:    Manual:  Performed rotational grade IV mobs, (B) to upper cervical and lower segments C2-C7, side glides, (B),  "overpressure with max rotation into flexion, and PA mobs C2-T5 and adjacent ribs in supine, inferior glides for pain relief, suboccipital release, distraction, upper trap & lev scap stretching with overpressure, CTJ grade IV mobs (B), inferior glides to T2 -T7, distraction in sitting with max rotation (B), pt in mutilpe positions (supine, prone and sitting) as per previous sessions, pt supine on 1/2 round so thoracic spine in extension, towel on chin for distraction & moved into extension    Assessment:     Pt stated he is moving better and his ROM measurements have improved.  Manual performed as noted.  + cavitations noted in multiple joints. He would like to continue with PT up to his surgery on April 5th.  He is afraid the neck will stiffen back up after surgery.  This is the \"loosest\" that's he been in a long while.     Plan:  Extend POC up to the day before surgery (4-5-24). 2 x wk for 2 more wks.   Continue with mobility prior to upcoming surgery.                                                                          Current Participants as of 3/20/2024    Name Type Comments Contact Info    Nakul Meyer MD PCP - General Please sign POC.  [phone]    Signature pending    Stephania Louie PT Physical Therapist  [phone]          "

## 2024-03-20 NOTE — PROGRESS NOTES
Physical Therapy Treatment Note       Patient Name: Arian Forde  MRN: [id number]  Today's Date:  3-20-24     Time Calculation  Start Time: 1400  Stop Time: 1500  Time Calculation (min): 60 min  PT Therapeutic Procedures Time Entry  Manual Therapy Time Entry: 60  Visit: 14/18  Referred by: Nakul Meyer MD    Insurance:  [facility]   Certification Dates:  1-24-24 to 3-  Current Problem: {M47.812, M50.321]    1. Other cervical disc degeneration at C4-C5 level        2. Spondylosis without myelopathy or radiculopathy, cervical region        3. Neck stiffness        4. Cervical spondylosis without myelopathy  Follow Up In Physical Therapy      5. Degeneration of intervertebral disc at C4-C5 level  Follow Up In Physical Therapy            Subjective:  Pt stated his neck is stiff today, he just came from a case, he had 50 lbs of lead on.  He also jacked up his back last weekend, so he's moving very gingerly.         Patient's Living Environment/PLOF: Pt lives with spouse and son.  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, he sees a LMT about 1 x month, she performs deep tissue on the right side of my neck, he's also current with a chiroprator, he is currently limited with heavy lifting, OH lifting and reaching due to a Left RTCT (4-5-24 surgery)    Patient's Therapy Goals: to decrease stiffness, be able to move his neck better prior to his upcoming left shld surgery     Pain:  Intensity: 1-2/10, 4-5/10 after therapy it's sore, he has to ice it            Location: entire cervical spine, (B), Rt >> Lt, C3-C7            Duration: dull ache intermittent            Aggravating Factor: prolonged positions, 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            Alleviating Factor:  current with chiropractic, and LMT, his son & wife have also been trying to help him stretch    Precautions:  Left shoulder due to Lt RTCT        Objective     Treatments:    Manual:  Performed rotational grade IV mobs, (B) to  "upper cervical and lower segments C2-C7, side glides, (B), overpressure with max rotation into flexion, and PA mobs C2-T5 and adjacent ribs in supine, inferior glides for pain relief, suboccipital release, distraction, upper trap & lev scap stretching with overpressure, CTJ grade IV mobs (B), inferior glides to T2 -T7, distraction in sitting with max rotation (B), pt in mutilpe positions (supine, prone and sitting) as per previous sessions, pt supine on 1/2 round so thoracic spine in extension, towel on chin for distraction & moved into extension    Assessment:     Pt stated he is moving better and his ROM measurements have improved.  Manual performed as noted.  + cavitations noted in multiple joints. He would like to continue with PT up to his surgery on April 5th.  He is afraid the neck will stiffen back up after surgery.  This is the \"loosest\" that's he been in a long while.     Plan:  Extend POC up to the day before surgery (4-5-24). 2 x wk for 2 more wks.   Continue with mobility prior to upcoming surgery.                                                                   "

## 2024-03-26 ENCOUNTER — TREATMENT (OUTPATIENT)
Dept: PHYSICAL THERAPY | Facility: CLINIC | Age: 59
End: 2024-03-26
Payer: COMMERCIAL

## 2024-03-26 DIAGNOSIS — M43.6 NECK STIFFNESS: Primary | ICD-10-CM

## 2024-03-26 DIAGNOSIS — M47.812 SPONDYLOSIS WITHOUT MYELOPATHY OR RADICULOPATHY, CERVICAL REGION: ICD-10-CM

## 2024-03-26 DIAGNOSIS — M50.321 DEGENERATION OF INTERVERTEBRAL DISC AT C4-C5 LEVEL: ICD-10-CM

## 2024-03-26 DIAGNOSIS — M50.321 OTHER CERVICAL DISC DEGENERATION AT C4-C5 LEVEL: ICD-10-CM

## 2024-03-26 DIAGNOSIS — M47.812 CERVICAL SPONDYLOSIS WITHOUT MYELOPATHY: ICD-10-CM

## 2024-03-26 PROCEDURE — 97140 MANUAL THERAPY 1/> REGIONS: CPT | Mod: GP

## 2024-03-26 NOTE — PROGRESS NOTES
Physical Therapy Treatment Note       Patient Name: Arian Forde  MRN: [id number]  Today's Date:  3-26-24     Time Calculation  Start Time: 1800  Stop Time: 1900  Time Calculation (min): 60 min  PT Therapeutic Procedures Time Entry  Manual Therapy Time Entry: 60  Visit: 15/18  Referred by: Nakul Meyer MD    [facility]   Certification Dates:  1-24-24 to 3-  Current Problem: {M47.812, M50.321]    1. Neck stiffness        2. Spondylosis without myelopathy or radiculopathy, cervical region        3. Other cervical disc degeneration at C4-C5 level        4. Cervical spondylosis without myelopathy  Follow Up In Physical Therapy      5. Degeneration of intervertebral disc at C4-C5 level  Follow Up In Physical Therapy            Subjective:  Pt stated his neck is stiff today, he drove a lot & not sure if weather affects it.  He is moving much better & feels better going into surgery next week.     Patient's Living Environment/PLOF: Pt lives with spouse and son.  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            Alleviating Factor:  Chiro, MT, & support from son & wife with stretching    Precautions:  Left shoulder , Lt RTCT, surgery scheduled on 4-5-2024        Objective     Treatments:    Manual:  Performed rotational grade IV  "mobs, (B) to upper cervical and lower segments C2-C7, side glides, (B), overpressure with max rotation into flexion, and PA mobs C2-T5 and adjacent ribs in supine, inferior glides for pain relief, suboccipital release, distraction, upper trap & lev scap stretching with overpressure, CTJ grade IV mobs (B), inferior glides to T2 -T7, distraction in sitting with max rotation (B), pt in mutilpe positions (supine, prone and sitting) as per previous sessions, pt supine on 1/2 round so thoracic spine in extension, towel on chin for distraction & moved into extension    Assessment:     Pt does not have as much of a kyphosis now.  Posture has normalized.  He still feels a tightness bilaterally in C2C3.  Manual performed as noted.  + cavitations noted in multiple joints. He is scheduled with PT up to his surgery on April 4th.  He is afraid the neck will stiffen back up after surgery.  This is the \"loosest\" that's he been in a long while. He's happy with progress to date.      Plan:  Extend POC up to the day before surgery (4-5-24). 2 x wk for 2 more wks.   Continue with mobility prior to upcoming surgery.                                                                                 "

## 2024-03-28 ENCOUNTER — TREATMENT (OUTPATIENT)
Dept: PHYSICAL THERAPY | Facility: CLINIC | Age: 59
End: 2024-03-28
Payer: COMMERCIAL

## 2024-03-28 DIAGNOSIS — M47.812 CERVICAL SPONDYLOSIS WITHOUT MYELOPATHY: ICD-10-CM

## 2024-03-28 DIAGNOSIS — M50.321 DEGENERATION OF INTERVERTEBRAL DISC AT C4-C5 LEVEL: ICD-10-CM

## 2024-03-28 DIAGNOSIS — M50.321 OTHER CERVICAL DISC DEGENERATION AT C4-C5 LEVEL: Primary | ICD-10-CM

## 2024-03-28 DIAGNOSIS — M47.812 SPONDYLOSIS WITHOUT MYELOPATHY OR RADICULOPATHY, CERVICAL REGION: ICD-10-CM

## 2024-03-28 DIAGNOSIS — M43.6 NECK STIFFNESS: ICD-10-CM

## 2024-03-28 PROCEDURE — 97140 MANUAL THERAPY 1/> REGIONS: CPT | Mod: GP

## 2024-03-28 NOTE — PROGRESS NOTES
Physical Therapy Treatment Note       Patient Name: Arian Forde  MRN: [id number]  Today's Date:  3-28-24     Time Calculation  Start Time: 1745  Stop Time: 1845  Time Calculation (min): 60 min  PT Therapeutic Procedures Time Entry  Manual Therapy Time Entry: 60  Visit: 16/18  Referred by: Nakul Meyer MD    Insurance:  [employer] [facility]   Certification Dates:  1-24-24 to 3-  Current Problem: {M47.812, M50.321]    1. Other cervical disc degeneration at C4-C5 level        2. Spondylosis without myelopathy or radiculopathy, cervical region        3. Neck stiffness              Subjective:  Pt stated his neck is much looser than when he 1st started PT.  He feels good going into surgery next week. He will see chiro 1 more time & PT 2 more times prior to surgery.      Patient's Living Environment/PLOF: Pt lives with spouse and son.  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            Alleviating Factor:  Chiro, MT, & support from son & wife with stretching    Precautions:  Left shoulder , Lt RTCT, surgery scheduled on 4-5-2024        Objective   Cervical AROM: (degrees)        3-28-24   Flexion 40   Extension 30   Right Sidebend 18   Left Sidebend 22   Right Rotation 42   Left Rotation 45       Treatments:    Manual:  Performed rotational grade IV  mobs, (B) to upper cervical and lower segments C2-C7, side glides, (B), overpressure with max rotation into flexion, and PA mobs C2-T5 and adjacent ribs in supine, inferior glides for pain relief, suboccipital release, distraction, upper trap & lev scap stretching with overpressure, CTJ grade IV mobs (B), inferior glides to T2 -T7, distraction in sitting with max rotation (B), pt in mutilpe positions (supine, prone and sitting) as per previous sessions, pt supine on 1/2 round so thoracic spine in extension, towel on chin for distraction & moved into extension    Assessment:     Pt's posture & awareness of postural related pain have improved. He still feels a tightness bilaterally, less in C2-C3 now.  Manual performed as noted.  + cavitations noted in multiple joints. He's happy with progress to date.      Plan:  Continue with mobility prior to upcoming surgery.

## 2024-04-02 ENCOUNTER — TREATMENT (OUTPATIENT)
Dept: PHYSICAL THERAPY | Facility: CLINIC | Age: 59
End: 2024-04-02
Payer: COMMERCIAL

## 2024-04-02 DIAGNOSIS — M47.812 SPONDYLOSIS WITHOUT MYELOPATHY OR RADICULOPATHY, CERVICAL REGION: ICD-10-CM

## 2024-04-02 DIAGNOSIS — M50.321 OTHER CERVICAL DISC DEGENERATION AT C4-C5 LEVEL: Primary | ICD-10-CM

## 2024-04-02 DIAGNOSIS — M43.6 NECK STIFFNESS: ICD-10-CM

## 2024-04-02 PROCEDURE — 97140 MANUAL THERAPY 1/> REGIONS: CPT | Mod: GP

## 2024-04-02 NOTE — PROGRESS NOTES
Physical Therapy Treatment Note       Patient Name: Arian Forde  MRN: [id number]  Today's Date:  4-2-24     Time Calculation  Start Time: 1800  Stop Time: 1900  Time Calculation (min): 60 min  PT Therapeutic Procedures Time Entry  Manual Therapy Time Entry: 60  Visit: 17/18  Referred by: Nakul Meyer MD    Insurance:  [facility]   Certification Dates:  1-24-24 to 3-  Current Problem: {M47.812, M50.321]    1. Other cervical disc degeneration at C4-C5 level        2. Spondylosis without myelopathy or radiculopathy, cervical region        3. Neck stiffness              Subjective:  Pt stated his neck is much looser than when he 1st started PT.  He feels good going into surgery on Friday. He will see chiro 1 more time & PT on Thurs prior to surgery.  Patient's Living Environment/PLOF: Pt lives with spouse and son.  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            Alleviating Factor:  Chiro, MT, & support from son & wife with stretching    Precautions:  Left shoulder , Lt RTCT, surgery scheduled on 4-5-2024        Objective   Cervical AROM: (degrees)      Eval     2-22-24     3-28-24   Flexion 37 38 40   Extension 20 28 30   Right Sidebend 15 16 18   Left Sidebend 13 14 22   Right Rotation 32 40 42   Left Rotation 32 45 45      Treatments:    Manual:  Performed rotational grade IV mobs, (B) to upper cervical and lower segments C2-C7, side glides, (B), overpressure with max rotation into flexion, and PA mobs C2-T5 and adjacent ribs in supine, inferior glides for pain relief, suboccipital release, distraction, upper trap & lev scap stretching with overpressure, CTJ grade IV mobs (B), inferior glides to T2 -T7, distraction in sitting with max rotation (B), pt in mutilpe positions (supine, prone and sitting) as per previous sessions, pt supine on 1/2 round so thoracic spine in extension, towel on chin for distraction & moved into extension    Assessment:     Pt's ROM has significantly improved.  He stated C2-C3 (B) tightness has now resolved.  Manual performed as noted.  + cavitations noted in multiple joints. He's happy with progress to date.      Plan:  Continue with mobility 1 more visit prior to upcoming surgery.

## 2024-04-04 ENCOUNTER — ANESTHESIA EVENT (OUTPATIENT)
Dept: OPERATING ROOM | Facility: HOSPITAL | Age: 59
End: 2024-04-04
Payer: COMMERCIAL

## 2024-04-04 ENCOUNTER — TREATMENT (OUTPATIENT)
Dept: PHYSICAL THERAPY | Facility: CLINIC | Age: 59
End: 2024-04-04
Payer: COMMERCIAL

## 2024-04-04 DIAGNOSIS — M43.6 NECK STIFFNESS: ICD-10-CM

## 2024-04-04 DIAGNOSIS — M50.321 OTHER CERVICAL DISC DEGENERATION AT C4-C5 LEVEL: Primary | ICD-10-CM

## 2024-04-04 DIAGNOSIS — M47.812 SPONDYLOSIS WITHOUT MYELOPATHY OR RADICULOPATHY, CERVICAL REGION: ICD-10-CM

## 2024-04-04 PROCEDURE — 97140 MANUAL THERAPY 1/> REGIONS: CPT | Mod: GP

## 2024-04-04 NOTE — PROGRESS NOTES
Physical Therapy Treatment Note       Patient Name: Arian Forde  MRN: [id number]  Today's Date:  4-4-24     Time Calculation  Start Time: 1800  Stop Time: 1900  Time Calculation (min): 60 min  PT Therapeutic Procedures Time Entry  Manual Therapy Time Entry: 60  Visit: 18/18  Referred by: Nakul Meyer MD    Insurance:  [facility]   Certification Dates:  1-24-24 to 3-  Current Problem: {M47.812, M50.321]    1. Other cervical disc degeneration at C4-C5 level        2. Spondylosis without myelopathy or radiculopathy, cervical region        3. Neck stiffness              Subjective:  Pt stated his neck is much looser than when he 1st started PT.  He feels good going into surgery on Friday. He will see chiro 1 more time & PT on Thurs prior to surgery.  Patient's Living Environment/PLOF: Pt lives with spouse and son.  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            Alleviating Factor:  Chiro, MT, & support from son & wife with stretching    Precautions:  Left shoulder , Lt RTCT, surgery scheduled on 4-5-2024        Objective   Cervical AROM: (degrees)      Eval     2-22-24     3-28-24     4-4-24   Flexion 37 38 40 36/44   Extension 20 28 30 30   Right Sidebend 15 16 18 20   Left Sidebend 13 14 22 25   Right Rotation 32 40 42 48   Left  Rotation 32 45 45 47     Treatments:    Manual:  Performed rotational grade IV mobs, (B) to upper cervical and lower segments C2-C7, side glides, (B), overpressure with max rotation into flexion, and PA mobs C2-T5 and adjacent ribs in supine, inferior glides for pain relief, suboccipital release, distraction, upper trap & lev scap stretching with overpressure, CTJ grade IV mobs (B), inferior glides to T2 -T7, distraction in sitting with max rotation (B), pt in mutilpe positions (supine, prone and sitting) as per previous sessions, pt supine on 1/2 round so thoracic spine in extension, towel on chin for distraction & moved into extension    Assessment:     Pt's ROM has significantly improved.  His chief goal was to be able to move better prior to his upcoming surgery.  Today Left C2-C3 was the most restricted when he 1st came into the clinic.  Manual performed as noted.  + cavitations noted in multiple joints. He is concerned that the neck may get stiff after the surgery.  He has made progress to date with ROM & he can rotate his head when driving now.  He still has significant ROM deficits & would benefit from continued PT.  Will wait until after surgery & see how the neck responds.      Plan:  DC pt from PT.  Pt has surgery tomorrow for Left RTC.   Will address neck with Lt Shoulder    Goals:   Active       PT Problem       PT Goal 1 (Met)       Start:  01/24/24    Expected End:  03/20/24    Resolved:  04/24/24    Pt independent with HEP and consistent for optimal recovery and self-management after DC from PT.             PT Goal 2 (Met)       Start:  01/24/24    Expected End:  03/20/24    Resolved:  03/07/24    Pt will decrease neck pain to < or = 2-3/10 to improve tolerance to ADL's, work         PT Goal 3 (Progressing)       Start:  01/24/24    Expected End:  03/20/24       Pt will decrease NDI to < or = 20% to demonstrate functional improvement.           PT Goal 4 (Progressing)       Start:  01/24/24     Expected End:  03/20/24       Pt will improve cervical AROM to > or = 3/4 AROM all planes to improve functional mobility, decrease stiffness

## 2024-04-05 ENCOUNTER — ANESTHESIA (OUTPATIENT)
Dept: OPERATING ROOM | Facility: HOSPITAL | Age: 59
End: 2024-04-05
Payer: COMMERCIAL

## 2024-04-05 ENCOUNTER — HOSPITAL ENCOUNTER (OUTPATIENT)
Facility: HOSPITAL | Age: 59
Setting detail: OUTPATIENT SURGERY
Discharge: HOME | End: 2024-04-05
Attending: STUDENT IN AN ORGANIZED HEALTH CARE EDUCATION/TRAINING PROGRAM | Admitting: STUDENT IN AN ORGANIZED HEALTH CARE EDUCATION/TRAINING PROGRAM
Payer: COMMERCIAL

## 2024-04-05 VITALS
HEART RATE: 57 BPM | BODY MASS INDEX: 29.4 KG/M2 | RESPIRATION RATE: 16 BRPM | HEIGHT: 68 IN | SYSTOLIC BLOOD PRESSURE: 165 MMHG | WEIGHT: 194 LBS | DIASTOLIC BLOOD PRESSURE: 89 MMHG | OXYGEN SATURATION: 94 % | TEMPERATURE: 97.2 F

## 2024-04-05 DIAGNOSIS — S43.432A DEGENERATIVE SUPERIOR LABRAL ANTERIOR-TO-POSTERIOR (SLAP) TEAR OF LEFT SHOULDER: ICD-10-CM

## 2024-04-05 DIAGNOSIS — M25.812 SHOULDER IMPINGEMENT, LEFT: ICD-10-CM

## 2024-04-05 DIAGNOSIS — M75.122 NONTRAUMATIC COMPLETE TEAR OF LEFT ROTATOR CUFF: ICD-10-CM

## 2024-04-05 DIAGNOSIS — M75.22 BICEPS TENDINITIS, LEFT: Primary | ICD-10-CM

## 2024-04-05 PROCEDURE — 2780000003 HC OR 278 NO HCPCS: Performed by: STUDENT IN AN ORGANIZED HEALTH CARE EDUCATION/TRAINING PROGRAM

## 2024-04-05 PROCEDURE — 2500000004 HC RX 250 GENERAL PHARMACY W/ HCPCS (ALT 636 FOR OP/ED): Performed by: STUDENT IN AN ORGANIZED HEALTH CARE EDUCATION/TRAINING PROGRAM

## 2024-04-05 PROCEDURE — 29823 SHO ARTHRS SRG XTNSV DBRDMT: CPT

## 2024-04-05 PROCEDURE — 2500000004 HC RX 250 GENERAL PHARMACY W/ HCPCS (ALT 636 FOR OP/ED): Mod: JZ

## 2024-04-05 PROCEDURE — 23430 REPAIR BICEPS TENDON: CPT

## 2024-04-05 PROCEDURE — 29827 SHO ARTHRS SRG RT8TR CUF RPR: CPT | Performed by: STUDENT IN AN ORGANIZED HEALTH CARE EDUCATION/TRAINING PROGRAM

## 2024-04-05 PROCEDURE — C1713 ANCHOR/SCREW BN/BN,TIS/BN: HCPCS | Performed by: STUDENT IN AN ORGANIZED HEALTH CARE EDUCATION/TRAINING PROGRAM

## 2024-04-05 PROCEDURE — 2500000004 HC RX 250 GENERAL PHARMACY W/ HCPCS (ALT 636 FOR OP/ED)

## 2024-04-05 PROCEDURE — 64415 NJX AA&/STRD BRCH PLXS IMG: CPT | Performed by: STUDENT IN AN ORGANIZED HEALTH CARE EDUCATION/TRAINING PROGRAM

## 2024-04-05 PROCEDURE — 3700000002 HC GENERAL ANESTHESIA TIME - EACH INCREMENTAL 1 MINUTE: Performed by: STUDENT IN AN ORGANIZED HEALTH CARE EDUCATION/TRAINING PROGRAM

## 2024-04-05 PROCEDURE — 29826 SHO ARTHRS SRG DECOMPRESSION: CPT

## 2024-04-05 PROCEDURE — A29827 PR SHLDR ARTHROSCOP,SURG,W/ROTAT CUFF REPR: Performed by: ANESTHESIOLOGIST ASSISTANT

## 2024-04-05 PROCEDURE — 3600000004 HC OR TIME - INITIAL BASE CHARGE - PROCEDURE LEVEL FOUR: Performed by: STUDENT IN AN ORGANIZED HEALTH CARE EDUCATION/TRAINING PROGRAM

## 2024-04-05 PROCEDURE — 2500000004 HC RX 250 GENERAL PHARMACY W/ HCPCS (ALT 636 FOR OP/ED): Performed by: ANESTHESIOLOGIST ASSISTANT

## 2024-04-05 PROCEDURE — 2500000001 HC RX 250 WO HCPCS SELF ADMINISTERED DRUGS (ALT 637 FOR MEDICARE OP)

## 2024-04-05 PROCEDURE — 7100000001 HC RECOVERY ROOM TIME - INITIAL BASE CHARGE: Performed by: STUDENT IN AN ORGANIZED HEALTH CARE EDUCATION/TRAINING PROGRAM

## 2024-04-05 PROCEDURE — 64420 NJX AA&/STRD NTRCOST NRV 1: CPT | Performed by: STUDENT IN AN ORGANIZED HEALTH CARE EDUCATION/TRAINING PROGRAM

## 2024-04-05 PROCEDURE — 2720000007 HC OR 272 NO HCPCS: Performed by: STUDENT IN AN ORGANIZED HEALTH CARE EDUCATION/TRAINING PROGRAM

## 2024-04-05 PROCEDURE — 29826 SHO ARTHRS SRG DECOMPRESSION: CPT | Performed by: STUDENT IN AN ORGANIZED HEALTH CARE EDUCATION/TRAINING PROGRAM

## 2024-04-05 PROCEDURE — 76942 ECHO GUIDE FOR BIOPSY: CPT | Performed by: STUDENT IN AN ORGANIZED HEALTH CARE EDUCATION/TRAINING PROGRAM

## 2024-04-05 PROCEDURE — 7100000010 HC PHASE TWO TIME - EACH INCREMENTAL 1 MINUTE: Performed by: STUDENT IN AN ORGANIZED HEALTH CARE EDUCATION/TRAINING PROGRAM

## 2024-04-05 PROCEDURE — 2500000005 HC RX 250 GENERAL PHARMACY W/O HCPCS: Performed by: ANESTHESIOLOGIST ASSISTANT

## 2024-04-05 PROCEDURE — 7100000002 HC RECOVERY ROOM TIME - EACH INCREMENTAL 1 MINUTE: Performed by: STUDENT IN AN ORGANIZED HEALTH CARE EDUCATION/TRAINING PROGRAM

## 2024-04-05 PROCEDURE — 23430 REPAIR BICEPS TENDON: CPT | Performed by: STUDENT IN AN ORGANIZED HEALTH CARE EDUCATION/TRAINING PROGRAM

## 2024-04-05 PROCEDURE — A29827 PR SHLDR ARTHROSCOP,SURG,W/ROTAT CUFF REPR: Performed by: STUDENT IN AN ORGANIZED HEALTH CARE EDUCATION/TRAINING PROGRAM

## 2024-04-05 PROCEDURE — 29827 SHO ARTHRS SRG RT8TR CUF RPR: CPT

## 2024-04-05 PROCEDURE — 7100000009 HC PHASE TWO TIME - INITIAL BASE CHARGE: Performed by: STUDENT IN AN ORGANIZED HEALTH CARE EDUCATION/TRAINING PROGRAM

## 2024-04-05 PROCEDURE — A4649 SURGICAL SUPPLIES: HCPCS | Performed by: STUDENT IN AN ORGANIZED HEALTH CARE EDUCATION/TRAINING PROGRAM

## 2024-04-05 PROCEDURE — 3600000009 HC OR TIME - EACH INCREMENTAL 1 MINUTE - PROCEDURE LEVEL FOUR: Performed by: STUDENT IN AN ORGANIZED HEALTH CARE EDUCATION/TRAINING PROGRAM

## 2024-04-05 PROCEDURE — 3700000001 HC GENERAL ANESTHESIA TIME - INITIAL BASE CHARGE: Performed by: STUDENT IN AN ORGANIZED HEALTH CARE EDUCATION/TRAINING PROGRAM

## 2024-04-05 PROCEDURE — A4217 STERILE WATER/SALINE, 500 ML: HCPCS | Performed by: STUDENT IN AN ORGANIZED HEALTH CARE EDUCATION/TRAINING PROGRAM

## 2024-04-05 PROCEDURE — 29823 SHO ARTHRS SRG XTNSV DBRDMT: CPT | Performed by: STUDENT IN AN ORGANIZED HEALTH CARE EDUCATION/TRAINING PROGRAM

## 2024-04-05 DEVICE — ANCHOR, SWIVEL LOCK, BIOMPOSITE, KNOTLESS, 4.75MM X 19.1MM, W/P2 SUTURE BLUE: Type: IMPLANTABLE DEVICE | Site: SHOULDER | Status: FUNCTIONAL

## 2024-04-05 DEVICE — IMPLANTABLE DEVICE: Type: IMPLANTABLE DEVICE | Site: SHOULDER | Status: FUNCTIONAL

## 2024-04-05 DEVICE — ANCHOR, BIOCOMPOSITE SWIVELOCK C, DBL LOADED, 4.75 X 22: Type: IMPLANTABLE DEVICE | Site: SHOULDER | Status: FUNCTIONAL

## 2024-04-05 RX ORDER — HYDRALAZINE HYDROCHLORIDE 20 MG/ML
5 INJECTION INTRAMUSCULAR; INTRAVENOUS EVERY 30 MIN PRN
Status: DISCONTINUED | OUTPATIENT
Start: 2024-04-05 | End: 2024-04-05 | Stop reason: HOSPADM

## 2024-04-05 RX ORDER — ASPIRIN 81 MG/1
81 TABLET ORAL 2 TIMES DAILY
Qty: 30 TABLET | Refills: 0 | Status: SHIPPED | OUTPATIENT
Start: 2024-04-05 | End: 2024-04-20

## 2024-04-05 RX ORDER — IPRATROPIUM BROMIDE 0.5 MG/2.5ML
500 SOLUTION RESPIRATORY (INHALATION) AS NEEDED
Status: DISCONTINUED | OUTPATIENT
Start: 2024-04-05 | End: 2024-04-05 | Stop reason: HOSPADM

## 2024-04-05 RX ORDER — FENTANYL CITRATE 50 UG/ML
50 INJECTION, SOLUTION INTRAMUSCULAR; INTRAVENOUS EVERY 5 MIN PRN
Status: DISCONTINUED | OUTPATIENT
Start: 2024-04-05 | End: 2024-04-05 | Stop reason: HOSPADM

## 2024-04-05 RX ORDER — FENTANYL CITRATE 50 UG/ML
INJECTION, SOLUTION INTRAMUSCULAR; INTRAVENOUS AS NEEDED
Status: DISCONTINUED | OUTPATIENT
Start: 2024-04-05 | End: 2024-04-05

## 2024-04-05 RX ORDER — CELECOXIB 200 MG/1
200 CAPSULE ORAL ONCE
Status: COMPLETED | OUTPATIENT
Start: 2024-04-05 | End: 2024-04-05

## 2024-04-05 RX ORDER — LIDOCAINE HYDROCHLORIDE 10 MG/ML
INJECTION, SOLUTION EPIDURAL; INFILTRATION; INTRACAUDAL; PERINEURAL AS NEEDED
Status: DISCONTINUED | OUTPATIENT
Start: 2024-04-05 | End: 2024-04-05

## 2024-04-05 RX ORDER — SODIUM CHLORIDE, SODIUM LACTATE, POTASSIUM CHLORIDE, CALCIUM CHLORIDE 600; 310; 30; 20 MG/100ML; MG/100ML; MG/100ML; MG/100ML
100 INJECTION, SOLUTION INTRAVENOUS CONTINUOUS
Status: DISCONTINUED | OUTPATIENT
Start: 2024-04-05 | End: 2024-04-05 | Stop reason: HOSPADM

## 2024-04-05 RX ORDER — PROCHLORPERAZINE EDISYLATE 5 MG/ML
5 INJECTION INTRAMUSCULAR; INTRAVENOUS ONCE AS NEEDED
Status: DISCONTINUED | OUTPATIENT
Start: 2024-04-05 | End: 2024-04-05 | Stop reason: HOSPADM

## 2024-04-05 RX ORDER — GABAPENTIN 300 MG/1
300 CAPSULE ORAL ONCE
Status: COMPLETED | OUTPATIENT
Start: 2024-04-05 | End: 2024-04-05

## 2024-04-05 RX ORDER — ACETAMINOPHEN 325 MG/1
975 TABLET ORAL ONCE
Status: COMPLETED | OUTPATIENT
Start: 2024-04-05 | End: 2024-04-05

## 2024-04-05 RX ORDER — CEFAZOLIN SODIUM 2 G/100ML
2 INJECTION, SOLUTION INTRAVENOUS ONCE
Status: COMPLETED | OUTPATIENT
Start: 2024-04-05 | End: 2024-04-05

## 2024-04-05 RX ORDER — PHENYLEPHRINE HCL IN 0.9% NACL 1 MG/10 ML
SYRINGE (ML) INTRAVENOUS AS NEEDED
Status: DISCONTINUED | OUTPATIENT
Start: 2024-04-05 | End: 2024-04-05

## 2024-04-05 RX ORDER — ONDANSETRON HYDROCHLORIDE 2 MG/ML
INJECTION, SOLUTION INTRAVENOUS AS NEEDED
Status: DISCONTINUED | OUTPATIENT
Start: 2024-04-05 | End: 2024-04-05

## 2024-04-05 RX ORDER — LABETALOL HYDROCHLORIDE 5 MG/ML
5 INJECTION, SOLUTION INTRAVENOUS ONCE AS NEEDED
Status: DISCONTINUED | OUTPATIENT
Start: 2024-04-05 | End: 2024-04-05 | Stop reason: HOSPADM

## 2024-04-05 RX ORDER — DIPHENHYDRAMINE HYDROCHLORIDE 50 MG/ML
12.5 INJECTION INTRAMUSCULAR; INTRAVENOUS ONCE AS NEEDED
Status: DISCONTINUED | OUTPATIENT
Start: 2024-04-05 | End: 2024-04-05 | Stop reason: HOSPADM

## 2024-04-05 RX ORDER — ALBUTEROL SULFATE 0.83 MG/ML
2.5 SOLUTION RESPIRATORY (INHALATION) ONCE AS NEEDED
Status: DISCONTINUED | OUTPATIENT
Start: 2024-04-05 | End: 2024-04-05 | Stop reason: HOSPADM

## 2024-04-05 RX ORDER — FENTANYL CITRATE 50 UG/ML
25 INJECTION, SOLUTION INTRAMUSCULAR; INTRAVENOUS EVERY 5 MIN PRN
Status: DISCONTINUED | OUTPATIENT
Start: 2024-04-05 | End: 2024-04-05 | Stop reason: HOSPADM

## 2024-04-05 RX ORDER — MIDAZOLAM HYDROCHLORIDE 1 MG/ML
2 INJECTION, SOLUTION INTRAMUSCULAR; INTRAVENOUS ONCE
Status: COMPLETED | OUTPATIENT
Start: 2024-04-05 | End: 2024-04-05

## 2024-04-05 RX ORDER — MEPERIDINE HYDROCHLORIDE 25 MG/ML
12.5 INJECTION INTRAMUSCULAR; INTRAVENOUS; SUBCUTANEOUS EVERY 10 MIN PRN
Status: DISCONTINUED | OUTPATIENT
Start: 2024-04-05 | End: 2024-04-05 | Stop reason: HOSPADM

## 2024-04-05 RX ORDER — PROPOFOL 10 MG/ML
INJECTION, EMULSION INTRAVENOUS CONTINUOUS PRN
Status: DISCONTINUED | OUTPATIENT
Start: 2024-04-05 | End: 2024-04-05

## 2024-04-05 RX ORDER — ACETAMINOPHEN 500 MG
1000 TABLET ORAL EVERY 8 HOURS PRN
Qty: 60 TABLET | Refills: 1 | Status: SHIPPED | OUTPATIENT
Start: 2024-04-05 | End: 2024-04-25

## 2024-04-05 RX ORDER — FENTANYL CITRATE 50 UG/ML
50 INJECTION, SOLUTION INTRAMUSCULAR; INTRAVENOUS ONCE
Status: COMPLETED | OUTPATIENT
Start: 2024-04-05 | End: 2024-04-05

## 2024-04-05 RX ORDER — OXYCODONE HYDROCHLORIDE 5 MG/1
5 TABLET ORAL EVERY 4 HOURS PRN
Qty: 15 TABLET | Refills: 0 | Status: SHIPPED | OUTPATIENT
Start: 2024-04-05 | End: 2024-04-08

## 2024-04-05 RX ORDER — ONDANSETRON 4 MG/1
4 TABLET, FILM COATED ORAL EVERY 8 HOURS PRN
Qty: 20 TABLET | Refills: 0 | Status: SHIPPED | OUTPATIENT
Start: 2024-04-05 | End: 2024-04-12

## 2024-04-05 RX ORDER — ONDANSETRON HYDROCHLORIDE 2 MG/ML
4 INJECTION, SOLUTION INTRAVENOUS ONCE AS NEEDED
Status: DISCONTINUED | OUTPATIENT
Start: 2024-04-05 | End: 2024-04-05 | Stop reason: HOSPADM

## 2024-04-05 RX ADMIN — PROPOFOL 200 MG: 10 INJECTION, EMULSION INTRAVENOUS at 10:03

## 2024-04-05 RX ADMIN — ONDANSETRON 4 MG: 2 INJECTION INTRAMUSCULAR; INTRAVENOUS at 11:13

## 2024-04-05 RX ADMIN — SODIUM CHLORIDE, SODIUM LACTATE, POTASSIUM CHLORIDE, AND CALCIUM CHLORIDE 100 ML/HR: 600; 310; 30; 20 INJECTION, SOLUTION INTRAVENOUS at 08:17

## 2024-04-05 RX ADMIN — MIDAZOLAM HYDROCHLORIDE 2 MG: 1 INJECTION, SOLUTION INTRAMUSCULAR; INTRAVENOUS at 09:42

## 2024-04-05 RX ADMIN — FENTANYL CITRATE 50 MCG: 50 INJECTION INTRAMUSCULAR; INTRAVENOUS at 10:03

## 2024-04-05 RX ADMIN — FENTANYL CITRATE 50 MCG: 50 INJECTION INTRAMUSCULAR; INTRAVENOUS at 09:42

## 2024-04-05 RX ADMIN — CELECOXIB 200 MG: 200 CAPSULE ORAL at 08:17

## 2024-04-05 RX ADMIN — ACETAMINOPHEN 975 MG: 325 TABLET ORAL at 08:17

## 2024-04-05 RX ADMIN — GABAPENTIN 300 MG: 300 CAPSULE ORAL at 08:17

## 2024-04-05 RX ADMIN — LIDOCAINE HYDROCHLORIDE 5 ML: 10 INJECTION, SOLUTION EPIDURAL; INFILTRATION; INTRACAUDAL; PERINEURAL at 10:03

## 2024-04-05 RX ADMIN — Medication 100 MCG: at 10:11

## 2024-04-05 RX ADMIN — FENTANYL CITRATE 50 MCG: 50 INJECTION INTRAMUSCULAR; INTRAVENOUS at 11:06

## 2024-04-05 RX ADMIN — DEXAMETHASONE SODIUM PHOSPHATE 8 MG: 4 INJECTION, SOLUTION INTRAMUSCULAR; INTRAVENOUS at 11:13

## 2024-04-05 RX ADMIN — CEFAZOLIN SODIUM 2 G: 2 INJECTION, SOLUTION INTRAVENOUS at 10:05

## 2024-04-05 SDOH — HEALTH STABILITY: MENTAL HEALTH: CURRENT SMOKER: 0

## 2024-04-05 ASSESSMENT — PAIN SCALES - GENERAL
PAINLEVEL_OUTOF10: 0 - NO PAIN
PAINLEVEL_OUTOF10: 0 - NO PAIN
PAINLEVEL_OUTOF10: 3
PAINLEVEL_OUTOF10: 0 - NO PAIN

## 2024-04-05 ASSESSMENT — COLUMBIA-SUICIDE SEVERITY RATING SCALE - C-SSRS
2. HAVE YOU ACTUALLY HAD ANY THOUGHTS OF KILLING YOURSELF?: NO
1. IN THE PAST MONTH, HAVE YOU WISHED YOU WERE DEAD OR WISHED YOU COULD GO TO SLEEP AND NOT WAKE UP?: NO
6. HAVE YOU EVER DONE ANYTHING, STARTED TO DO ANYTHING, OR PREPARED TO DO ANYTHING TO END YOUR LIFE?: NO

## 2024-04-05 ASSESSMENT — PAIN - FUNCTIONAL ASSESSMENT
PAIN_FUNCTIONAL_ASSESSMENT: 0-10

## 2024-04-05 ASSESSMENT — PAIN DESCRIPTION - DESCRIPTORS: DESCRIPTORS: NAGGING

## 2024-04-05 NOTE — OP NOTE
Left shoulder arthroscopy, rotator cuff repair, intra-articular debridement and synovectomy, subacromial decompression and acromioplasty and open subpectoral biceps tenodesis (L) Operative Note     Date: 2024  OR Location: [facility]    Name: Arian Forde, : 1965, Age: 58 y.o., MRN: [id number], Sex: male    Diagnosis  Pre-op Diagnosis     * Nontraumatic complete tear of left rotator cuff [M75.122]     * Degenerative superior labral anterior-to-posterior (SLAP) tear of left shoulder [S43.432A]     * Shoulder impingement, left [M25.812]     * Biceps tendinitis, left [M75.22] Post-op Diagnosis     * Nontraumatic complete tear of left rotator cuff [M75.122]     * Degenerative superior labral anterior-to-posterior (SLAP) tear of left shoulder [S43.432A]     * Shoulder impingement, left [M25.812]     * Biceps tendinitis, left [M75.22]     Procedures  1.  Left shoulder arthroscopy, rotator cuff repair of subscapularis and supraspinatus  2.  Left shoulder arthroscopic subacromial decompression acromioplasty  3.  Left shoulder arthroscopic extensive intra-articular debridement and synovectomy  4.  Left shoulder open subpectoral biceps tenodesis    Surgeons      * Horacio Mukherjee - Primary    Resident/Fellow/Other Assistant:  Surgeon(s) and Role:     * Gabrielle Lopresti, PA-C - Assisting  Jada Pereyra MD     Procedure Summary  Anesthesia: Regional  ASA: I  Anesthesia Staff: Anesthesiologist: Amari Quinteros MD  C-AA: NAYAN Power  Estimated Blood Loss: 5mL  Intra-op Medications:   Administrations occurring from 0930 to 1100 on 24:   Medication Name Total Dose   EPINEPHrine (Adrenalin) 1 mg in sodium chloride 0.9 % 3,000 mL irrigation 1 mL   lactated Ringer's infusion Cannot be calculated   ceFAZolin in dextrose (iso-os) (Ancef) IVPB 2 g 2 g   fentaNYL PF (Sublimaze) injection 50 mcg 50 mcg   midazolam (Versed) injection 2 mg 2 mg          Anesthesia Record               Intraprocedure I/O Totals           Intake    lactated Ringer's infusion 550.00 mL    ceFAZolin in dextrose (iso-os) (Ancef) IVPB 2 g 100.00 mL    Total Intake 650 mL       Output    Urine 0 mL    Est. Blood Loss 20 mL    Total Output 20 mL       Net    Net Volume 630 mL          Specimen: No specimens collected     Staff:   Circulator: Stephania Terrazas RN  Scrub Person: Janine Allen; Miriam Reeves     Drains and/or Catheters: * None in log *    Tourniquet Times: N/A     Implants:  Implants       Type Name Action Serial No.      Implant ANCHOR, SWIVEL LOCK, BIOMPOSITE, KNOTLESS, 4.75MM X 19.1MM, W/P2 SUTURE BLUE - [id number] Implanted      Implant ANCHOR, SWIVEL LOCK, BIOMPOSITE, KNOTLESS, 4.75MM X 19.1MM, W/P2 SUTURE BLUE - [id number] Implanted      Implant ANCHOR, BIOCOMPOSITE SWIVELOCK C, DBL LOADED, 4.75 X 22 - [id number] Implanted       4.75MM SUTURE ANCHOR WITH THREE MINITAPE SUTURES (BLUE, COBRAID WHITE, COBRAID BLUE)   Implanted       ALL SUTURE ANCHOR WITH ONE MINI TAPE SUTURE Implanted             Findings: Full-thickness supraspinatus tear, full-thickness upper border subscapularis tear, degenerative SLAP tear and biceps tenosynovitis, subacromial impingement/bursitis     Indications: Arian Forde is an 58 y.o. male who is having surgery for full-thickness rotator cuff tear, degenerative SLAP tear, biceps tenosynovitis and subacromial impingement/bursitis.  He failed extensive nonoperative management and continued to have significant pain and dysfunction in the shoulder with limitations in active range of motion and strength.  He underwent an MRI which confirms the above.  After long discussion with the patient, he elected to proceed with surgical invention the form of a left shoulder arthroscopy, rotator cuff repair, open subpectoral biceps tenodesis, subacromial decompression and acromioplasty, extensive intra-articular debridement.  I thoroughly explained the risks and benefits as well as the expected postoperative timeline for the  proposed procedure versus nonoperative management. Risks of this procedure include but are not limited to bleeding, infection, nerve injury, DVT and failure of repair or implant.  The patient expressed understanding and wished to proceed with surgical intervention.  All questions were answered. They were consented to the above procedure at bedside.    The patient was seen in the preoperative area. The risks, benefits, complications, treatment options, non-operative alternatives, expected recovery and outcomes were discussed with the patient. The possibilities of reaction to medication, pulmonary aspiration, injury to surrounding structures, bleeding, recurrent infection, the need for additional procedures, failure to diagnose a condition, and creating a complication requiring transfusion or operation were discussed with the patient. The patient concurred with the proposed plan, giving informed consent.  The site of surgery was properly noted/marked if necessary per policy. The patient has been actively warmed in preoperative area. Preoperative antibiotics have been ordered and given within 1 hours of incision. Venous thrombosis prophylaxis have been ordered including bilateral sequential compression devices    Procedure Details:   The patient was seen in the preoperative holding area where the correct site and side were signed my initials. The patient was seen by the anesthesia team and a preoperative regional anesthetic block was administered.  The patient was brought back to the operating room after smooth induction of LMA anesthesia she was placed in the beach chair position.  All bony prominences were padded.  The patient was belted and posted.  SCD boots were placed on bilateral lower extremities.  The contralateral arm was placed in a well-padded arm cuevas.  The operative arm was prepped and draped in usual sterile fashion and placed in the pneumatic arm cuevas.  Prior to the start of the procedure,  preoperative antibiotics were administered by the anesthesia team.  Prior to incision, a preoperative time-out was performed confirming the correct patient, side, site and procedure to be performed.  All in the room were in agreement.    We began the procedure with the standard posterior portal to the shoulder.  Local anesthetic was administered at the posterior portal site and the posterior portal was established with a 11. Blade through the skin.  The arthroscope was introduced atraumatically into the glenohumeral joint. An anterior portal was established using an outside in technique with spinal needle.  The skin was incised with a #11 blade and a threaded cannula was inserted atraumatically. Diagnostic arthroscopy was performed demonstrating intact chondral surfaces on the glenoid and the humeral head.  There was an extensive degenerative SLAP tear with an unstable biceps anchor.  The biceps tendon demonstrated high-grade tendinosis with tearing.  The biceps tendon was tenotomized for later tenodesis.  The subscapularis demonstrated a full-thickness upper border tear and the supraspinatus demonstrated a full-thickness tear with minimal retraction.  The infraspinatus was intact and the inferior pouch was clear of loose bodies.  An extensive intra-articular debridement was performed the anterior superior and posterior aspects of the glenohumeral joint, glenoid, glenoid labrum, humeral head, rotator cuff, biceps tendon, middle glenohumeral ligament and capsular tissue as well as the subacromial space as noted below with combination of arthroscopic shaver and ablator.    We then proceeded to the subscapularis repair portion of the case.  An interval portal was established using outside in technique with a spinal needle followed by sharp incision through the skin and careful introduction of a fully threaded cannula over a switching stick.  A gentle decortication was performed of the lesser tuberosity in preparation  for repair.  A gentle debridement was performed of the rotator cuff tissue.  A self retrieving suture passing device was used to pass ultra loop sutures x 2 and luggage tag fashion through the upper border of the subscapularis tendon tissue.  The rotator cuff was then reduced to the lesser tuberosity and secured with a 4.75 bio composite swivel lock which was punched and inserted after the sutures were appropriately tensioned.  This demonstrated an excellent repair of the subscapularis with restoration of the normal resting tension.    At this point the arthroscope was removed and inserted into the subacromial space.  A lateral portal was established using outside in technique with a spinal needle followed by a sharp incision through the skin with a #11 blade and the arthroscopic trocar to dilate the portal.  There was extensive bursitis, and thus an extensive bursectomy was performed using a combination of the arthroscopic shaver and arthroscopic ablator.  The coracoacromial ligament was elevated off of the anterior acromion revealing an anterior acromial spur.  Using a bone cutting shaver, the subacromial spur was removed back to a flat surface completing the acromioplasty.  The rotator cuff was inspected from the acromial side which demonstrated a full-thickness tear of the supraspinatus.  A gentle decortication of the greater tuberosity was performed in preparation for repair.  A gentle debridement of the supraspinatus tendon was performed with an arthroscopic shaver.  A single triple loaded 4.75 bio composite healicoil anchor was punched and inserted along the articular margin.  Sutures were then passed sequentially using a self retrieving suture passing device with good spread to cover the entirety of the tear.  1 limb of each suture was then secured to a lateral row 4.75 bio composite swivel lock which was punched and inserted after the sutures were appropriately tensioned.  The remaining limb of each suture  was then secured to a second lateral row 4.75 bio composite SwiveLock which was punched and inserted after the sutures were appropriately tensioned.  This demonstrated an excellent double row repair of the full-thickness supraspinatus tear with restoration of the tendon down to its attachment on the greater tuberosity.    Arthroscopic photos were taken throughout.  Excess arthroscopic fluid was drained from the shoulder and the arthroscopic instruments were removed.      Finally attention was turned to the subpec biceps tenodesis.  A 2.5 cm incision in the axilla was made with a 15 blade and blunt dissection was carried out down to the subpec region.  The long head of the biceps tendon was palpated and manually removed from the incision.  A Bovie cautery was used to clear synovitic tissue out of the biceps groove.  Extensive synovitic tissue was removed from the biceps tendon.  A mini Q fix was drilled and inserted in the biceps groove.  1 limb of the suture was passed and [place] fashion through the musculotendinous junction of the biceps after it was appropriately tensioned.  The second limb was passed once and used as a post.  The biceps was then reduced into the groove using a push pull technique and tied. This demonstrated a secure repair of the biceps tendon which was under good tension.  The wound was thoroughly irrigated.  Biceps incision was closed in layers with buried interrupted 2-0 Vicryl suture on the subcuticular layer followed by running 3-0 Monocryl in the skin.  The wound was dressed with Dermabond, Telfa, dry gauze and Tegaderm.    The wounds were thoroughly irrigated.  Portals were closed with buried interrupted 3-0 Monocryl sutures and dressed with Steri-Strips, dry gauze and Tegaderms.  The operative arm was placed in a shoulder sling with an abduction pillow with a cryotherapy pad placed on the operative shoulder.    At the end of the procedure all needle, lap and instrument counts were  correct.    The patient was woken from anesthesia and transferred to the recovery room in stable condition.  The patient tolerated the procedure well.    I was present for all critical portions of this case.    Postoperative instructions:  The patient will remain in a shoulder sling with an abduction pillow at all times except for bathing.  The patient will return to see me in 2 weeks for a routine 2 week postoperative visit.    Gabrielle LoPresti, PA-C was present for the entire case.  Her assistance was necessary and critical to the successful completion of the procedure.  She provided skilled assistance with arm positioning, arthroscope manipulation, implant insertion and suture passage as well as wound closure.  A qualified assistant was not available to perform her portion of the case.    Complications:  None; patient tolerated the procedure well.    Disposition: PACU - hemodynamically stable.  Condition: stable     Attending Attestation: I was present and scrubbed for the entire procedure.    Horacio Mukherjee  Phone Number: [phone]

## 2024-04-05 NOTE — ANESTHESIA PREPROCEDURE EVALUATION
Patient: Arian Forde    Procedure Information       Date/Time: 04/05/24 0942    Procedure: Left shoulder arthroscopy, rotator cuff repair, intra-articular debridement and synovectomy, subacromial decompression and acromioplasty and open subpectoral biceps tenodesis (Left: Shoulder) - 1hr    Location: [facility] / [facility]    Surgeons: Horacio Mukherjee MD            Relevant Problems   Anesthesia (within normal limits)      Cardiac   (-) History of coronary artery bypass graft   (-) Pacemaker      Pulmonary   (-) Asthma   (-) Chronic obstructive pulmonary disease (CMS/HCC)   (-) KELSY (obstructive sleep apnea)      Neuro   (-) CVA (cerebral vascular accident) (CMS/HCC)   (-) Seizures (CMS/HCC)      Endocrine   (-) Diabetes mellitus, type 2 (CMS/HCC)      Hematology   (-) Coagulopathy (CMS/HCC)      Musculoskeletal   (+) Other cervical disc degeneration at C4-C5 level   (+) Spondylosis without myelopathy or radiculopathy, cervical region       Clinical information reviewed:   Tobacco  Allergies  Meds   Med Hx  Surg Hx   Fam Hx  Soc Hx        NPO Detail:  NPO/Void Status  Date of Last Liquid: 04/04/24  Time of Last Liquid: 2000  Date of Last Solid: 04/04/24  Time of Last Solid: 2000  Time of Last Void: 0745         Physical Exam    Airway  Mallampati: I  TM distance: >3 FB  Neck ROM: full     Cardiovascular    Dental    Pulmonary    Abdominal            Anesthesia Plan    History of general anesthesia?: yes  History of complications of general anesthesia?: no    ASA 1     general and regional     The patient is not a current smoker.    intravenous induction   Postoperative administration of opioids is intended.  Anesthetic plan and risks discussed with patient.  Use of blood products discussed with patient who consented to blood products.

## 2024-04-05 NOTE — ANESTHESIA PROCEDURE NOTES
Airway  Date/Time: 4/5/2024 10:03 AM  Urgency: elective    Airway not difficult    Staffing  Performed: NAYAN   Authorized by: Amari Quinteros MD    Performed by: NAYAN Power  Patient location during procedure: OR    Indications and Patient Condition  Indications for airway management: anesthesia  Spontaneous Ventilation: absent  Sedation level: deep  Preoxygenated: yes  Patient position: sniffing  Mask difficulty assessment: 1 - vent by mask    Final Airway Details  Final airway type: supraglottic airway      Successful airway: classic  Size 4  Cuff Pressure (cm H2O): 10     Number of attempts at approach: 1    Additional Comments  Easy placement, no problems. (Ambu)

## 2024-04-05 NOTE — ANESTHESIA PROCEDURE NOTES
Peripheral Block    Patient location during procedure: holding area  Start time: 4/5/2024 9:49 AM  End time: 4/5/2024 9:50 AM  Reason for block: at surgeon's request and post-op pain management  Staffing  Performed: attending   Authorized by: Amari Quinteros MD    Performed by: Amari Quinteros MD  Preanesthetic Checklist  Completed: patient identified, IV checked, site marked, risks and benefits discussed, surgical consent, monitors and equipment checked, pre-op evaluation and timeout performed   Timeout performed at: 4/5/2024 9:41 AM  Peripheral Block  Patient position: sitting  Prep: ChloraPrep  Patient monitoring: heart rate, cardiac monitor and continuous pulse ox  Anesthesia block type: icb.  Laterality: left  Injection technique: single-shot  Guidance: ultrasound guided  Local infiltration: ropivacaine  Infiltration strength: 0.5 %  Dose: 8 mL  Needle  Needle gauge: 20 G  Needle length: 5 cm  Needle localization: ultrasound guidance  Assessment  Injection assessment: negative aspiration for heme, no paresthesia on injection, incremental injection and local visualized surrounding nerve on ultrasound  Heart rate change: no  Slow fractionated injection: no

## 2024-04-05 NOTE — DISCHARGE INSTRUCTIONS
Horacio Mukhereje M.D.   Sports Medicine Orthopaedic Surgery    [facility]  [address] [facility]                  [address] [facility]       [address]  [facility], [address]   Phone: [phone]         Phone: [phone]       Phone: [phone]   Fax: [phone]                         Fax: [phone]       Fax: [phone]     After hours phone number: [phone]    AFTER SURGERY     Anesthesia  If you received a nerve block during surgery, you may have numbness or inability to move the limb. Do not be alarmed as this may last 8-36 hours depending upon the amount and type of medication used by the anesthesiologist. Make sure If you are experiencing numbness after 36 hours, please call the office. When the nerve block begins to wear off, you will feel a tingling sensation, like pins and needles. It is important that you start taking the pain medication at that time to ensure that you “stay ahead of the pain.” It is important to take the pain medicine when the pain level is a 4 or 5/10, before it gets too high.    Do not operate heavy machinery, make major decisions, drink alcohol or smoke for 24 hours. Do not drink alcohol while taking pain medications.    Prescribed Medications   Narcotic pain medicine (Oxycodone): The goal of post-operative pain management is pain control, NOT pain elimination. You should expect some pain after surgery - this pain helps you protect itself while it is healing. Constipation, nausea, itching, and drowsiness are side effects of this type of medication. You should take an over-the-counter stool softener (Colace and/or Senna) while taking narcotics to prevent constipation. If you experience itching, over the counter Benadryl may be helpful. Narcotic pain medications often produce drowsiness and it is against the law to operate a vehicle  while taking these medications. If you are taking oxycodone, you should take acetaminophen (Tylenol) around the clock to decrease baseline pain. Do not take Tylenol-containing products while on Percocet or [place].   Refill Policy: For concerns over your safety due to the rising opioid addiction epidemic in the [place], refills of your narcotic pain medications will only be provided on a case by case basis. Please use these medications judiciously.    Anti-inflammatory (NSAID) medicine (Naproxen or Mobic): These are both anti-inflammatory and pain relief. Do NOT take this medication if you have had an ulcer in the past unless you have cleared this with your primary care doctor. You should take NSAIDs with food or antacid to reduce the chance of upset stomach. Depending on your surgery, Dr. Mukherjee may instruct you to avoid these medications.    Anti-nausea medicine (ondansetron/Zofran): sometimes patients experience nausea related to either anesthesia or the narcotic pain medication. If this is the case you will find this medication helpful.    DVT prophylaxis (Aspirin or Eliquis): For most patients, activity alone is sufficient to prevent dangerous blood clots, but in some cases your personal risk profile and/or the type of surgery you have undergone makes it necessary that you take medication to help prevent blood clots. Dr. Mukherjee will inform you if you are to start one of these medications postoperatively.    Stool softener (Colace and/or Senna): are available over the counter at your local pharmacy and should be taken while you are taking narcotic pain medication to avoid constipation. You should stop taking these medications if you develop diarrhea. Over the counter laxatives may be used if you develop painful constipation.     Diet   Start with clear liquids (water, juice, Gatorade) and light foods (jello, soup, crackers). Progress to normal diet as tolerated if you are not nauseated. Avoid greasy or spicy  foods for the first 24hrs to avoid GI upset. Increase fluid intake to help prevent constipation.    Dressings / Wound Care  You may remove the outer dressing after 3 days and then can shower. (If you have a splint, please leave the splint in place until follow-up.) Do not remove Steri-strips (white stickers) if present over your incisions. Steri-strips may fall off on their own, which is normal. After the bandage has been removed, you may leave the incisions open to air. Alternatively, if you prefer to keep them covered, you may do so with Band-Aids, a light gauze dressing, or a clean ACE wrap. You may shower after the bandage has been removed (3 days), but it is very important that you pat the wounds dry after the shower. It is OK to allow soap and water to run over the wound - DO NOT soak or scrub the wound. Outside of the shower, keep your incision clean and dry until your first postoperative visit, approximately 10-14 days after surgery. You may remove your sling or brace to shower, unless otherwise instructed. As your balance may be affected by recent surgery, we recommend placing a plastic chair in the shower to help prevent falls. Do NOT take baths, go into a pool, or soak the operative site until approved by Dr. Mukherjee.    Bracing / Physical Therapy   If you were given one, make sure you wear sling or brace at all times until your follow-up with Dr. Mukherjee. Only remove your sling or brace for physical therapy, home exercises, and hygiene. These are typically used for 4-6 weeks after surgery in order to protect the healing of the tissue.     Physical therapy is just as important to your recovery as the actual operation! If you were given a prescription for physical therapy, make sure you go to your appointments and do your exercises daily at home (especially motion exercises).     Ice is a very important part of your recovery. It helps reduce inflammation and improves pain control. You should ice a few times  each day for 20-30 minutes at a time. Please make sure there is something under the ice (clean towel, cloth, T-shirt) so that the ice doesn't directly contact your skin. If you ordered a commercially available ice machine (optional) and a compression setting is available, you should use LOW or no compression during the first 5 days. After that, you may increase compression setting as tolerated. If the compression is bothering you then do not use compression.    Driving / Travel  Ultimately, it is your judgment to decide when you are safe to drive, but if you are at all unsure, do not risk your life or someone else's. As a general guideline, you will not be able to drive for 4-6 weeks after surgery. You should certainly not drive while on narcotics pain medication or while in a brace or sling.     Avoid flights and long distance traveling for 6 weeks after surgery. It is important to discuss your travel plans with Dr. Mukherjee, as additional medications may need to be prescribed to help prevent blood clots if certain travel is unavoidable.     Return to Work or School   Your return to work will depend on what surgery was done and what type of work you do. Please note that these are general guidelines, and there may be modifications based on your unique situation. Typically, you may return to sedentary work or school 3-7 days after surgery if pain is tolerable and you are no longer requiring narcotic pain medication. In conjunction with your input, Dr. Mukherjee will determine when you may return to more physically rigorous demands.     If you had Knee or Hip Surgery   If your surgery involves a ligament reconstruction or tendon repair, you will typically be prescribed crutches for at least the first few days until pain and the postoperative protocol allows you to fully bear weight and also wear a brace for 4-6 weeks. If cartilage surgery or meniscus repair is performed, you may be on crutches for 6 weeks. Individual  rehabilitation guidelines will vary based upon the unique situation and surgery of every patient, but take these general guidelines into account when planning return to work.     If you had Shoulder Surgery   If your surgery involves a repair (rotator cuff repair, labral repair), you will have a sling on for six weeks after surgery. If you have a sling, you will need to wear it all day. Please wear the sling at all times except for bathing for the first 2 weeks minimum. As long as you can abide by the restrictions, you can return to work when you feel like you can do so safely. However, you will need to take into consideration driving and activities related to your job. You may be able to safely loosen it if you are able to keep your arm supported. Please understand that you will NOT be able to work with your arm away from your body, above shoulder level, or use your arm against gravity for approximately 8 weeks. For jobs that require physical labor, you may require four months or more to return to work. If your surgery does NOT involve a repair (subacromial decompression, distal clavicle excision, capsular release), then you will be in a sling for only a few days after surgery. When comfortable, you may return to work when ready to conduct normal activities of your job. Remember that you may be on narcotic pain medications and these should be discontinued prior to your return to work. For jobs that require physical labor, you may require 6 weeks or more to return to work.     If you had Elbow or Wrist Surgery   If your surgery involves a repair or reconstruction, you will have a splint and sling on followed by a brace for four to six weeks after surgery. As long as you can abide by the restrictions, you can return to work when you feel like you can do so safely. However, you will need to take into consideration driving and activities related to your job. If you have a sling, you will need to wear it all day unless  otherwise instructed. You may be able to safely loosen it if you are able to keep your arm supported. For jobs that require physical labor, you may require four months or more to return to work.    If you had Ankle Surgery   If your surgery involves a repair or reconstruction, you will have a splint followed by a walking boot for four to six weeks after surgery. As long as you can abide by the restrictions, you can return to work when you feel like you can do so safely. However, you will need to take into consideration driving and activities related to your job. For jobs that require physical labor, you may require four months or more to return to work.    Normal Sensations and Findings after Surgery:   PAIN: We do everything possible to make your pain/discomfort level tolerable, but some amount of pain is to be expected.   WARMTH: Mild warmth around the operative site is normal for up to 3 weeks.   REDNESS: Small amount of redness where the sutures enter the skin is normal. If redness worsens or spreads it is important that you contact the office.   DRAINAGE: A small amount is normal for the first 48-72 hours. If wounds continue to drain after this time (requiring multiple gauze changes per day), please contact the office.   NUMBNESS: Around the incision is common.   BRUISING: Is common and often tracks down the arm or leg due to gravity and results in an alarming appearance, but is common and will resolve with time.   FEVER: Low-grade fevers (less than 101.5°F) are common during the first week after surgery. You should drink plenty of fluids and breathe deeply.   CONSTIPATION: Post-operative pain medications as well as immobility can lead to constipation. Please consider taking an over the counter stool softener such as Colace and/or Senna if you experience constipation or if you have a history of constipation.    Follow-Up   A Follow-up appointment should be arranged for 10-14 days after surgery. If one has not  been provided, please call the office to schedule.       NOTIFY US IMMEDIATELY FOR ANY OF THE FOLLOWING:   Most orthopedic surgical procedures are uneventful. However, complications can occur. The following are things to be aware of in the immediate postoperative period.   FEVER - Temperature rises above 101.5°F or associated chills/sweats   WOUND - If you notice drainage more than 4 days after surgery, if the drainage turns yellows and foul smelling, if you need to change gauze multiple times per day, or if sutures become loose.   CARDIOVASCULAR - Chest pain, shortness of breath, palpitations, or fainting spells must be taken seriously. Go to the emergency room (or call 911) immediately for evaluation.   BLOOD CLOTS - Orthopaedic surgery patients are at risk for blood clots. While the risk is higher for lower extremity surgery, even those who have undergone upper extremity surgery are at an increased risk. Please notify Dr. Mukherjee if you or someone in your family has had blood clots or any type of known clotting disorder. Signs of blood clots may include calf pain or cramping, diffuse swelling in the leg and foot, or chest pain and shortness of breath. Please call the office or go to the hospital if you recognize any of these symptoms.   NAUSEA - If you have severe vomiting, diarrhea, or constipation, or cannot keep any liquid down   URINARY RETENTION - If you cannot urinate the night after surgery, please go to the Emergency Room.

## 2024-04-05 NOTE — ANESTHESIA PROCEDURE NOTES
Peripheral Block    Patient location during procedure: holding area  Start time: 4/5/2024 9:45 AM  End time: 4/5/2024 9:48 AM  Reason for block: at surgeon's request and post-op pain management  Staffing  Performed: attending   Authorized by: Amari Quinteros MD    Performed by: Amari Quinteros MD  Preanesthetic Checklist  Completed: patient identified, IV checked, site marked, risks and benefits discussed, surgical consent, monitors and equipment checked, pre-op evaluation and timeout performed   Timeout performed at: 4/5/2024 9:41 AM  Peripheral Block  Patient position: sitting  Prep: ChloraPrep  Patient monitoring: heart rate, cardiac monitor and continuous pulse ox  Block type: interscalene  Laterality: left  Injection technique: single-shot  Guidance: ultrasound guided  Local infiltration: ropivacaine  Infiltration strength: 0.5 %  Dose: 22 mL  Needle  Needle gauge: 20 G  Needle length: 5 cm  Needle localization: ultrasound guidance  Assessment  Injection assessment: negative aspiration for heme, no paresthesia on injection, incremental injection and local visualized surrounding nerve on ultrasound  Heart rate change: no  Slow fractionated injection: no

## 2024-04-05 NOTE — ANESTHESIA POSTPROCEDURE EVALUATION
Patient: Arian Forde    Procedure Summary       Date: 04/05/24 Room / Location: [facility] / [facility]    Anesthesia Start: 0954 Anesthesia Stop: 1151    Procedure: Left shoulder arthroscopy, rotator cuff repair, intra-articular debridement and synovectomy, subacromial decompression and acromioplasty and open subpectoral biceps tenodesis (Left: Shoulder) Diagnosis:       Nontraumatic complete tear of left rotator cuff      Degenerative superior labral anterior-to-posterior (SLAP) tear of left shoulder      Shoulder impingement, left      Biceps tendinitis, left      (Nontraumatic complete tear of left rotator cuff [M75.122])      (Degenerative superior labral anterior-to-posterior (SLAP) tear of left shoulder [S43.432A])      (Shoulder impingement, left [M25.812])      (Biceps tendinitis, left [M75.22])    Surgeons: Horacio Mukherjee MD Responsible Provider: Amari Quinteros MD    Anesthesia Type: general, regional ASA Status: 1            Anesthesia Type: general, regional    Vitals Value Taken Time   /80 04/05/24 1220   Temp 36 °C (96.8 °F) 04/05/24 1220   Pulse 59 04/05/24 1220   Resp 12 04/05/24 1220   SpO2 94 % 04/05/24 1220       Anesthesia Post Evaluation    Patient location during evaluation: PACU  Patient participation: complete - patient participated  Level of consciousness: awake  Pain management: adequate  Multimodal analgesia pain management approach  Airway patency: patent  Cardiovascular status: acceptable and hemodynamically stable  Respiratory status: acceptable and spontaneous ventilation  Hydration status: euvolemic  Postoperative Nausea and Vomiting: none  Comments: No nausea or vomiting        There were no known notable events for this encounter.

## 2024-04-05 NOTE — PERIOPERATIVE NURSING NOTE
Patient in Phase 2; dressed and up to chair with RN assist. Tolerating po fluids, no complaint of pain and no complaint of nausea.     Significant other at bedside; discussed discharge instructions with patient and Significant other. All questions at this time answered.     Patient clinically appropriate for discharge. IV removed and patient transported to discharge area via wheelchair.

## 2024-04-05 NOTE — BRIEF OP NOTE
Date: 2024  OR Location: [facility]    Name: Arian Forde, : 1965, Age: 58 y.o., MRN: [id number], Sex: male    Diagnosis  Pre-op Diagnosis     * Nontraumatic complete tear of left rotator cuff [M75.122]     * Degenerative superior labral anterior-to-posterior (SLAP) tear of left shoulder [S43.432A]     * Shoulder impingement, left [M25.812]     * Biceps tendinitis, left [M75.22] Post-op Diagnosis     * Nontraumatic complete tear of left rotator cuff [M75.122]     * Degenerative superior labral anterior-to-posterior (SLAP) tear of left shoulder [S43.432A]     * Shoulder impingement, left [M25.812]     * Biceps tendinitis, left [M75.22]     Procedures  1.  Left shoulder arthroscopy, rotator cuff repair of subscapularis and supraspinatus  2.  Left shoulder arthroscopic subacromial decompression acromioplasty  3.  Left shoulder arthroscopic extensive intra-articular debridement and synovectomy  4.  Left shoulder open subpectoral biceps tenodesis    Surgeons      * Horacio Mukherjee - Primary    Resident/Fellow/Other Assistant:  Surgeon(s) and Role:     * Gabrielle Lopresti, PA-C - Assisting  Jada Pereyra MD    Procedure Summary  Anesthesia: Regional  ASA: I  Anesthesia Staff: Anesthesiologist: Amari Quinteros MD  C-AA: NAYAN Power  Estimated Blood Loss: 5mL  Intra-op Medications:   Administrations occurring from 0930 to 1100 on 24:   Medication Name Total Dose   EPINEPHrine (Adrenalin) 1 mg in sodium chloride 0.9 % 3,000 mL irrigation 1 mL   lactated Ringer's infusion Cannot be calculated   ceFAZolin in dextrose (iso-os) (Ancef) IVPB 2 g 2 g   fentaNYL PF (Sublimaze) injection 50 mcg 50 mcg   midazolam (Versed) injection 2 mg 2 mg          Anesthesia Record               Intraprocedure I/O Totals          Intake    lactated Ringer's infusion 550.00 mL    ceFAZolin in dextrose (iso-os) (Ancef) IVPB 2 g 100.00 mL    Total Intake 650 mL       Output    Urine 0 mL    Est. Blood Loss 20 mL    Total  Output 20 mL       Net    Net Volume 630 mL          Specimen: No specimens collected     Staff:   Circulator: Stephania Terrazas RN  Scrub Person: Janine Allen; Miriam Reeves    Findings: Full-thickness supraspinatus tear, full-thickness upper border subscapularis tear, degenerative SLAP tear and biceps tenosynovitis, subacromial impingement/bursitis    Complications:  None; patient tolerated the procedure well.     Disposition: PACU - hemodynamically stable.  Condition: stable  Specimens Collected: No specimens collected  Attending Attestation: I was present and scrubbed for the entire procedure.    Horacio Mukherjee  Phone Number: [phone]

## 2024-04-15 ENCOUNTER — TELEPHONE (OUTPATIENT)
Dept: OPERATING ROOM | Facility: HOSPITAL | Age: 59
End: 2024-04-15
Payer: COMMERCIAL

## 2024-04-15 NOTE — TELEPHONE ENCOUNTER
Called patient regarding the below message and attached photo. Patient denies any fevers, chills, N/V, malaise or drainage from incision sites. He noticed associated itching.. He does note that the redness has improved since he stopped using his ice machine directly to the skin. This does appear to be a contact skin reaction. I advised patient to use Benadryl for the next 24 hours and will call for an update tomorrow. He will continue to monitor for any developing symptoms and contact us immediately if so.     Regarding: Cellulitis vs normal  Contact: [phone]  ----- Message from Betsy Caldwell MA sent at 4/15/2024  9:51 AM EDT -----       ----- Message from Arian Forde to Gabrielle Lopresti, PA-C sent at 4/14/2024 12:56 PM -----   Does the attached picture look like normal healing. No fever also taking tylenol. Area itches warm to touch. Edema mostly medial distal humerus non pitting.    Date of surgery 4/5/24     [facility]  [phone]

## 2024-04-17 ENCOUNTER — TELEPHONE (OUTPATIENT)
Dept: ORTHOPEDIC SURGERY | Facility: HOSPITAL | Age: 59
End: 2024-04-17
Payer: COMMERCIAL

## 2024-04-17 NOTE — TELEPHONE ENCOUNTER
Called patient for an update on his left shoulder erythema. Patient has not developed any symptoms since we last spoke. He states he feels the redness has improved. He did take Benadryl which helped with the itching. He will continue to avoid direct skin contact with ice machine. I advised him to continue to monitor this and contact us immediately if the redness worsens or he develops any systemic symptoms including fever, chills, N/V, malaise, or drainage from incisions.     Gabrielle LoPresti PA-C

## 2024-04-22 ENCOUNTER — OFFICE VISIT (OUTPATIENT)
Dept: ORTHOPEDIC SURGERY | Facility: CLINIC | Age: 59
End: 2024-04-22
Payer: COMMERCIAL

## 2024-04-22 VITALS — WEIGHT: 194 LBS | HEIGHT: 68 IN | BODY MASS INDEX: 29.4 KG/M2

## 2024-04-22 DIAGNOSIS — M75.122 NONTRAUMATIC COMPLETE TEAR OF LEFT ROTATOR CUFF: ICD-10-CM

## 2024-04-22 PROCEDURE — 99024 POSTOP FOLLOW-UP VISIT: CPT

## 2024-04-22 NOTE — PROGRESS NOTES
PRIMARY CARE PHYSICIAN: Nakul Meyer MD    ORTHOPAEDIC POSTOPERATIVE VISIT    ASSESSMENT & PLAN    Impression: 58 y.o. male 2 week postop s/p Left shoulder arthroscopy, rotator cuff repair, intra-articular debridement and synovectomy, subacromial decompression and acromioplasty and open subpectoral biceps tenodesis done 4/5/24    Plan:   Overall Arian is doing well. He has minimal to no pain. He has been adhering to his sling protocol. He can begin performing Codman's pendulums out of his sling but otherwise will wear his sling at all times except for bathing. He will ice and rest the left shoulder. He did have an episode erythema around the left shoulder 1 week ago that appeared to be contact dermatitis associated with his ice machine. This improved with Benadryl and cessation of direct contact to the skin with his ice. He will continue to be mindful of this and provide protection of his skin from his ice machine. He will follow up with us in 4 weeks and at that time will being working with physical therapy.     I reviewed the intraoperative findings with the patient and answered all their questions. I reviewed their postoperative timeline and plan with them. They understand the postoperative precautions and the treatment plan going forward.     Follow-Up: Patient will follow-up in 4 weeks, no x-rays    At the end of the visit, all questions were answered in full. The patient is in agreement with the plan and recommendations. They will call the office with any questions/concerns.      Note dictated with [employer] software. Completed without full typed error editing and sent to avoid delay.      SUBJECTIVE  CHIEF COMPLAINT:   Chief Complaint   Patient presents with    Left Shoulder - Post-op        HPI: Arian Forde is a 58 y.o. patient now 2 week postop status post Left shoulder arthroscopy, rotator cuff repair, intra-articular debridement and synovectomy, subacromial decompression and  acromioplasty and open subpectoral biceps tenodesis done 4/5/24. Overall Arian is doing well. He has minimal to no pain. He has been adhering to his sling protocol. He did contact our office a week ago regarding erythema that developed around the left shoulder suddenly. There was associated itching but denies any other associated symptoms including fevers, chills, warmth, N/V, malaise. He associated this to his ice machine as he noticed it improved with cessation of his icing and with Benadryl. This has since resolved. No further redness or development of other symptoms. No concerns at this time.      REVIEW OF SYSTEMS  Constitutional: See HPI for pain assessment, No significant recent weight gain or loss, no fever or chills  Cardiovascular: No chest pain, shortness of breath  Respiratory: No difficulty breathing, no cough  Gastrointestinal: No nausea, vomiting, diarrhea, constipation  Musculoskeletal: Noted in HPI, positive for pain, restricted motion, stiffness  Integumentary: No rashes, easy bruising or skin lesions  Neurological: No headache, no numbness or tingling in extremities  Psychiatric: No mood changes or memory changes. No social issues  Heme/Lymph: No excessive swelling, easy bruising or excessive bleeding  ENT: No hearing changes, no nosebleeds  Eyes: No vision changes    CURRENT MEDICATIONS:   Current Outpatient Medications   Medication Sig Dispense Refill    acetaminophen (Tylenol) 500 mg tablet Take 2 tablets (1,000 mg) by mouth every 8 hours if needed for mild pain (1 - 3) for up to 20 days. 60 tablet 1    tiZANidine (Zanaflex) 4 mg tablet Take 1 tablet (4 mg) by mouth as needed at bedtime.       No current facility-administered medications for this visit.        OBJECTIVE    PHYSICAL EXAM      4/5/2024     9:52 AM 4/5/2024    11:50 AM 4/5/2024    12:05 PM 4/5/2024    12:20 PM 4/5/2024    12:39 PM 4/5/2024     1:00 PM 4/22/2024     9:54 AM   Vitals   Systolic 86 136 135 126 135 165    Diastolic  "74 82 76 80 88 89    Heart Rate 64 62 68 59 56 57    Temp  36 °C (96.8 °F)  36 °C (96.8 °F) 36 °C (96.8 °F) 36.2 °C (97.2 °F)    Resp 12 16 16 12 16 16    Height (in)       1.725 m (5' 7.91\")   Weight (lb)       194   BMI       29.58 kg/m2   BSA (m2)       2.05 m2   Visit Report       Report      Body mass index is 29.58 kg/m².    General: Well-appearing, no acute distress    Skin intact bilateral upper and lower extremities  No erythema  No warmth    Detailed examination of the left shoulder demonstrates:  Surgical incision(s) healing well  No erythema or warmth  No drainage  No ecchymosis  Resolving swelling, minimal  Biceps contour normal  Shoulder range of motion deferred  Upper extremity motor grossly intact  C5-T1 sensation intact bilaterally  2+ radial pulses bilaterally  Warm and well-perfused, brisk capillary refill    IMAGING:   No new imaging     "

## 2024-04-24 ENCOUNTER — TELEPHONE (OUTPATIENT)
Dept: ORTHOPEDIC SURGERY | Facility: CLINIC | Age: 59
End: 2024-04-24
Payer: COMMERCIAL

## 2024-05-20 ENCOUNTER — OFFICE VISIT (OUTPATIENT)
Dept: ORTHOPEDIC SURGERY | Facility: CLINIC | Age: 59
End: 2024-05-20
Payer: COMMERCIAL

## 2024-05-20 ENCOUNTER — EVALUATION (OUTPATIENT)
Dept: PHYSICAL THERAPY | Facility: CLINIC | Age: 59
End: 2024-05-20
Payer: COMMERCIAL

## 2024-05-20 VITALS — WEIGHT: 185 LBS | BODY MASS INDEX: 28.04 KG/M2 | HEIGHT: 68 IN

## 2024-05-20 DIAGNOSIS — M75.122 NONTRAUMATIC COMPLETE TEAR OF LEFT ROTATOR CUFF: Primary | ICD-10-CM

## 2024-05-20 PROCEDURE — 99024 POSTOP FOLLOW-UP VISIT: CPT | Performed by: STUDENT IN AN ORGANIZED HEALTH CARE EDUCATION/TRAINING PROGRAM

## 2024-05-20 PROCEDURE — 97161 PT EVAL LOW COMPLEX 20 MIN: CPT | Mod: GP

## 2024-05-20 PROCEDURE — 97140 MANUAL THERAPY 1/> REGIONS: CPT | Mod: GP

## 2024-05-20 ASSESSMENT — ENCOUNTER SYMPTOMS
LOSS OF SENSATION IN FEET: 0
DEPRESSION: 0
OCCASIONAL FEELINGS OF UNSTEADINESS: 0

## 2024-05-20 ASSESSMENT — PAIN - FUNCTIONAL ASSESSMENT: PAIN_FUNCTIONAL_ASSESSMENT: NO/DENIES PAIN

## 2024-05-20 NOTE — PROGRESS NOTES
PRIMARY CARE PHYSICIAN: Nakul Meyer MD    ORTHOPAEDIC POSTOPERATIVE VISIT    ASSESSMENT & PLAN    Impression: 58 y.o. male 6+ week postop s/p Left shoulder arthroscopy, rotator cuff repair, intra-articular debridement and synovectomy, subacromial decompression and acromioplasty and open subpectoral biceps tenodesis done 4/5/24    Plan:   Overall Arian is doing well. He has minimal to no pain and has been adhering to his sling protocol. He may discontinue with his sling today. He will begin working with physical therapy through the post-operative protocol for the above. We discussed his post-operative precautions and he expressed understanding. He will ice and rest the left shoulder as needed. Arian should remain out of work until he has progressed through the post-operative protocol and regained appropriate strength. Plan will be for Arian to remain out of work until August 5th.     I reviewed their postoperative timeline and plan with them. They understand the postoperative precautions and the treatment plan going forward.     Follow-Up: Patient will follow-up in 6 weeks, no x-rays    At the end of the visit, all questions were answered in full. The patient is in agreement with the plan and recommendations. They will call the office with any questions/concerns.      Note dictated with [employer] software. Completed without full typed error editing and sent to avoid delay.      SUBJECTIVE  CHIEF COMPLAINT: Post-op       HPI: Arian Forde is a 58 y.o. patient now 6+ week postop status post Left shoulder arthroscopy, rotator cuff repair, intra-articular debridement and synovectomy, subacromial decompression and acromioplasty and open subpectoral biceps tenodesis done 4/5/24.  He is doing well overall. His pain is well controlled. He states he has been adhering to his sling protocol. He is scheduled to start physical therapy today. No concerns at this time.     REVIEW OF  "SYSTEMS  Constitutional: See HPI for pain assessment, No significant recent weight gain or loss, no fever or chills  Cardiovascular: No chest pain, shortness of breath  Respiratory: No difficulty breathing, no cough  Gastrointestinal: No nausea, vomiting, diarrhea, constipation  Musculoskeletal: Noted in HPI, positive for pain, restricted motion, stiffness  Integumentary: No rashes, easy bruising or skin lesions  Neurological: No headache, no numbness or tingling in extremities  Psychiatric: No mood changes or memory changes. No social issues  Heme/Lymph: No excessive swelling, easy bruising or excessive bleeding  ENT: No hearing changes, no nosebleeds  Eyes: No vision changes    CURRENT MEDICATIONS:   Current Outpatient Medications   Medication Sig Dispense Refill    tiZANidine (Zanaflex) 4 mg tablet Take 1 tablet (4 mg) by mouth as needed at bedtime.       No current facility-administered medications for this visit.        OBJECTIVE    PHYSICAL EXAM      4/5/2024     9:52 AM 4/5/2024    11:50 AM 4/5/2024    12:05 PM 4/5/2024    12:20 PM 4/5/2024    12:39 PM 4/5/2024     1:00 PM 4/22/2024     9:54 AM   Vitals   Systolic 86 136 135 126 135 165    Diastolic 74 82 76 80 88 89    Heart Rate 64 62 68 59 56 57    Temp  36 °C (96.8 °F)  36 °C (96.8 °F) 36 °C (96.8 °F) 36.2 °C (97.2 °F)    Resp 12 16 16 12 16 16    Height (in)       1.725 m (5' 7.91\")   Weight (lb)       194   BMI       29.58 kg/m2   BSA (m2)       2.05 m2   Visit Report       Report      There is no height or weight on file to calculate BMI.    General: Well-appearing, no acute distress    Skin intact bilateral upper and lower extremities  No erythema  No warmth    Detailed examination of the left shoulder demonstrates:  Surgical incision(s) well healed  No erythema or warmth  No drainage  No ecchymosis  Resolving swelling, minimal  Biceps contour normal  Shoulder range of motion: forward flexion 90, ER neutral  Upper extremity motor grossly intact  C5-T1 " sensation intact bilaterally  2+ radial pulses bilaterally  Warm and well-perfused, brisk capillary refill    IMAGING:   No new imaging

## 2024-05-20 NOTE — PROGRESS NOTES
Physical Therapy Evaluation and Treatment      Patient Name: Arian Forde  MRN: [id number]  Today's Date: 2024     Time Calculation  Start Time: 1315  Stop Time: 1400  Time Calculation (min): 45 min  PT Therapeutic Procedures Time Entry  Manual Therapy Time Entry: 25  Self-Care/Home Mgmt Trainin  Visit: 1/MN  Referred by:  Garielle Lopresti, PA-C, (Dr. Horacio Mukherjee, surgeon)     Insurance:  [facility]   Certification Dates:  24 to 24  Current Problem: [ M75.122]  Nontraumatic complete tear of left rotator cuff   1. Nontraumatic complete tear of left rotator cuff  Referral to Physical Therapy    Follow Up In Physical Therapy               Surgery: s/p Lt shoulder arthroscopy, RTCR (subscapularis & supraspinatus), extensive intra-articular debridement & synovectomy, SAD, acromioplasty & open subpectoral biceps tenodesis on 24     Operative Findings: Full-thickness supraspinatus tear, full-thickness upper border subscapularis tear, degenerative SLAP tear and biceps tenosynovitis, subacromial impingement/bursitis   Subjective:  Pt states compliance with post op guidelines and initial exercises for hand, elbow, scapular retraction/elevation/depression, pendulums.  He was cleared to get out of the sling at his post op appt earlier today.     Patient's Living Environment/PLOF: Pt lives with spouse and son, has daughter in [employer].  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, RN  Patient's Therapy Goals: to return to baseline/PLOF for home & previous activities, yardwork, home maintenance w/o limitation     Pain:  Intensity: 2/10            Location: Lt Shld, surgical portals , and subpectoral surgical incision            Duration: Intermittent            Aggravating Factor: initial movement            Alleviating Factor:  OTC meds    HPI:  Pt had Lt RTCT for awhile (degenerative) & previous Lt distal biceps repair, found to have large spur  Social Factors:   1-2 personal factors &/or comorbidities     Precautions:  Per Protocol, RTW:  Tentative 24 (Pt reports there is no light duty at work)     Objective   Shoulder AROM: (degrees) Left Right   Flexion  (Lt PROM) 100 174   Abduction    ER @ Neutral 32 75   ER @ 90 Abd NT      90   ER, Functional NT      T2   IR, Functional NT      T7   IR, @ 90 Abd NT     90     Shoulder Strength: MMT Left Right   Flexion NT 5/5   C5 Abduction NT 5/5   ER NT 5/5   IR Nt 5/5   C6, Elbow Flex, Wrist Ext NT 5/5   C7, Elbow Ext, Wrist Flex NT 5/5   C8, Finger Flexion 5/5 5/5   C8, Finger Abd.Add 5/5 5/5   C8 T1, Thumb Opposition 5/5 5/5   *denotes pain with motion or muscle testing    : Rt 92, 98, 108, Av.3  : Lt 85, 95, 105, Av.0    Palpation: Lt Shld, surgical portals , and subpectoral surgical incision + tenderness, well healed portals & incision  Posture: kyphotic    Outcome Measures:  Quick DASH:  63.64% impaired    Treatments:    Manual:  PROM into shld flexion, and ER at neutral, pt supine, light distraction, STM near surgical incision and portals        Assessment:     Pt is a 59 yo left dominant male who presented to the clinic today s/p Lt shoulder arthroscopy, RTCR, intra-articular debridement & synovectomy, SAD, acromioplasty & open subpectoral biceps tenodesis on 24 .  PMH:   Lt bicep tendon repair 2019, back surgery 1985,L4,L5 fracture, cervical spondylosis without myelopathy or radiculopathy, cervical disc degeneration C4-C5.   Examination reveals the following deficits:  decreased Lt UE/shld strength, decreased Lt Shld AROM, decreased flexibility, kyphotic posture/, and functional use of his dominant shoulder due to post-op restrictions for healing.  These deficits lead to difficulties with ADLs, work tasks and recreational activity.  Skilled PT will address these deficits to help reduce pain for return to PLOF.  He will benefit from skilled PT to address the above mentioned  impairments.  Low complexity due to patient's clinical presentation being stable and uncomplicated by any significant comorbidities that may affect rehab tolerance and progression.     Clinical presentation:    Evolving with changing characteristics,       Plan: Frequency/duration: 2-3 x/wk over 16 wks      Planned Interventions:  MT, therapeutic exercise/strengthening/flexibility neuro re-ed, modalities prn  Patient/caregiver agrees with POC:  yes    Rehab potential:  Good   Plan of care agreement: Patient understands & agrees with goals & plan documented    EDUCATION: HEP, POC, activity modifications/progression, pain management/modalities, and injury pathology, post op guidelines, restrictions     Goals:   Active       PT Problem       PT Goal 1 (Met)       Start:  01/24/24    Expected End:  03/20/24    Resolved:  04/24/24    Pt independent with HEP and consistent for optimal recovery and self-management after DC from PT.             PT Goal 2 (Met)       Start:  01/24/24    Expected End:  03/20/24    Resolved:  03/07/24    Pt will decrease neck pain to < or = 2-3/10 to improve tolerance to ADL's, work         PT Goal 3 (Progressing)       Start:  01/24/24    Expected End:  09/09/24       Pt will decrease NDI to < or = 20% to demonstrate functional improvement.           PT Goal 4 (Progressing)       Start:  01/24/24    Expected End:  09/09/24       Pt will improve cervical AROM to > or = 3/4 AROM all planes to improve functional mobility, decrease stiffness              PT Problem       PT Goal 1       Start:  05/20/24    Expected End:  09/09/24       Pt will decrease pain, Lt shoulder, to < or = 2-3/10 to improve tolerance to ADL's, work tasks at home & work.           PT Goal 2       Start:  05/20/24    Expected End:  09/09/24       Pt will improve left shoulder AROM to w/in 10-15 degrees AROM all movements to improve functional reaching, OH activities, dressing.           PT Goal 3       Start:  05/20/24     Expected End:  09/09/24       Pt will decrease Quick Dash score to < or = 20% to demonstrate functional improvement.              strength       Patient will increase Lt UE/shld strength to > or  +4/5 to improve functional use of his dominant side for work & heavier ADL's.

## 2024-05-20 NOTE — LETTER
May 20, 2024     Patient: Arian Forde   YOB: 1965   Date of Visit: 5/20/2024       To Whom it May Concern:    Arian Forde was seen in my clinic on 5/20/2024. He is to remain out of work until 8/5/2024. Any questions or concerns please call us at [phone]          Sincerely,          Horacio Mukherjee MD        CC: No Recipients

## 2024-05-22 ENCOUNTER — TREATMENT (OUTPATIENT)
Dept: PHYSICAL THERAPY | Facility: CLINIC | Age: 59
End: 2024-05-22
Payer: COMMERCIAL

## 2024-05-22 DIAGNOSIS — M75.122 NONTRAUMATIC COMPLETE TEAR OF LEFT ROTATOR CUFF: ICD-10-CM

## 2024-05-22 PROCEDURE — 97140 MANUAL THERAPY 1/> REGIONS: CPT | Mod: GP

## 2024-05-22 NOTE — PROGRESS NOTES
Physical Therapy Treatment Note     Patient Name: Arian Forde  MRN: [id number]  Today's Date: 5/22/2024     Time Calculation  Start Time: 1315  Stop Time: 1400  Time Calculation (min): 45 min  PT Therapeutic Procedures Time Entry  Manual Therapy Time Entry: 45  Visit: 2/MN  Referred by:  Garielle Lopresti, PA-C, (Dr. Horacio Mukherjee, surgeon)     Insurance:  [facility]   Certification Dates:  5-20-24 to 9-9-24  Current Problem: [ M75.122]  Nontraumatic complete tear of left rotator cuff   1. Nontraumatic complete tear of left rotator cuff  Follow Up In Physical Therapy               Surgery: s/p Lt shoulder arthroscopy, RTCR (subscapularis & supraspinatus), extensive intra-articular debridement & synovectomy, SAD, acromioplasty & open subpectoral biceps tenodesis on 4/5/24     Operative Findings: Full-thickness supraspinatus tear, full-thickness upper border subscapularis tear, degenerative SLAP tear and biceps tenosynovitis, subacromial impingement/bursitis   Subjective:  Pt states compliance with post op guidelines and initial exercises for hand, elbow, scapular retraction/elevation/depression, pendulums.  He was cleared to get out of the sling at his post op appt earlier today.     Patient's Living Environment/PLOF: Pt lives with spouse and son, has daughter in [employer].  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, RN  Patient's Therapy Goals: to return to baseline/PLOF for home & previous activities, yardwork, home maintenance w/o limitation     Pain:  Intensity: 2/10            Location: Lt Shld, surgical portals , and subpectoral surgical incision            Duration: Intermittent            Aggravating Factor: initial movement            Alleviating Factor:  OTC meds    HPI:  Pt had Lt RTCT for awhile (degenerative) & previous Lt distal biceps repair, found to have large spur  Social Factors:  1-2 personal factors &/or comorbidities     Precautions:  Per Protocol,  RTW:  Tentative 8-5-24 (Pt reports there is no light duty at work)     Objective     Palpation: Lt Shld, surgical portals , and subpectoral surgical incision + tenderness, well healed portals & incision    Treatments:    Manual:  PROM into shld flexion, and ER at neutral, pt supine, light distraction, STM near surgical incision and portals, cervical distraction, upper trap stretching     Assessment:     Pt stated some soreness after his first visit, however, no pain per se.  He reports compliance with post op restrictions, no pushing, pulling,or lifting.  Manual performed as noted above and per protocol guidelines.  No pain during treatment, only reports of stiffness.        Plan: Continue per POC, working on progression towards FROM until cleared til next phase.

## 2024-05-24 ENCOUNTER — TREATMENT (OUTPATIENT)
Dept: PHYSICAL THERAPY | Facility: CLINIC | Age: 59
End: 2024-05-24
Payer: COMMERCIAL

## 2024-05-24 DIAGNOSIS — M75.122 NONTRAUMATIC COMPLETE TEAR OF LEFT ROTATOR CUFF: Primary | ICD-10-CM

## 2024-05-24 PROCEDURE — 97140 MANUAL THERAPY 1/> REGIONS: CPT | Mod: GP

## 2024-05-24 NOTE — PROGRESS NOTES
Physical Therapy Treatment Note     Patient Name: Arian Forde  MRN: [id number]  Today's Date: 5/24/2024     Time Calculation  Start Time: 0800  Stop Time: 0845  Time Calculation (min): 45 min  PT Therapeutic Procedures Time Entry  Manual Therapy Time Entry: 45  Visit: 3/MN  Referred by:  Garielle Lopresti, PA-C, (Dr. Horacio Mukherjee, surgeon)     Insurance:  [facility]   Certification Dates:  5-20-24 to 9-9-24  Current Problem: [ M75.122]  Nontraumatic complete tear of left rotator cuff   1. Nontraumatic complete tear of left rotator cuff  Follow Up In Physical Therapy                 Surgery: s/p Lt shoulder arthroscopy, RTCR (subscapularis & supraspinatus), extensive intra-articular debridement & synovectomy, SAD, acromioplasty & open subpectoral biceps tenodesis on 4/5/24     Operative Findings: Full-thickness supraspinatus tear, full-thickness upper border subscapularis tear, degenerative SLAP tear and biceps tenosynovitis, subacromial impingement/bursitis     Subjective:  Pt stated he went for a walk yesterday.  The left arm was kept in his pocket as not to swing.       Patient's Living Environment/PLOF: Pt lives with spouse and son, has daughter in [employer].  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, RN  Patient's Therapy Goals: to return to baseline/PLOF for home & previous activities, yardwork, home maintenance w/o limitation     Pain:  Intensity: 2/10            Location: Lt Shld, surgical portals , and subpectoral surgical incision            Duration: Intermittent            Aggravating Factor: initial movement            Alleviating Factor:  OTC meds    HPI:  Pt had Lt RTCT for awhile (degenerative) & previous Lt distal biceps repair, found to have large spur  Social Factors:  1-2 personal factors &/or comorbidities     Precautions:  Per Protocol, RTW:  Tentative 8-5-24 (Pt reports there is no light duty at work)     Objective     Palpation: Lt Shld, surgical  portals , and subpectoral surgical incision + tenderness, well healed portals & incision    Treatments:    Manual:  PROM into shld flexion, and ER at neutral, pt supine, light distraction, STM near surgical incision and portals, cervical distraction, upper trap stretching, scapular mobs in right sidelying       PHASE II:  Pt will be 10 Wks post op on 6-14-24 & allowed to start Phase II  Assessment:     Pt stated some soreness near an anterior surgical portal this morning, otherwise, everything else feels good.   Manual performed as noted above and per protocol guidelines.  No pain during treatment, only reports of stiffness.        Plan: Continue per POC, working on progression towards FROM until cleared til next phase.

## 2024-05-28 ENCOUNTER — TREATMENT (OUTPATIENT)
Dept: PHYSICAL THERAPY | Facility: CLINIC | Age: 59
End: 2024-05-28
Payer: COMMERCIAL

## 2024-05-28 DIAGNOSIS — M75.122 NONTRAUMATIC COMPLETE TEAR OF LEFT ROTATOR CUFF: Primary | ICD-10-CM

## 2024-05-28 PROCEDURE — 97140 MANUAL THERAPY 1/> REGIONS: CPT | Mod: GP

## 2024-05-28 NOTE — PROGRESS NOTES
Physical Therapy Treatment Note     Patient Name: Arian Forde  MRN: [id number]  Today's Date: 5/28/2024     Time Calculation  Start Time: 1000  Stop Time: 1045  Time Calculation (min): 45 min  PT Therapeutic Procedures Time Entry  Manual Therapy Time Entry: 45  Visit: 4/MN  Referred by:  Garielle Lopresti, PA-C, (Dr. Horacio Mukherjee, surgeon)     Insurance:  [facility]   Certification Dates:  5-20-24 to 9-9-24  Current Problem: [ M75.122]  Nontraumatic complete tear of left rotator cuff   1. Nontraumatic complete tear of left rotator cuff  Follow Up In Physical Therapy               Surgery: s/p Lt shoulder arthroscopy, RTCR (subscapularis & supraspinatus), extensive intra-articular debridement & synovectomy, SAD, acromioplasty & open subpectoral biceps tenodesis on 4/5/24     Operative Findings: Full-thickness supraspinatus tear, full-thickness upper border subscapularis tear, degenerative SLAP tear and biceps tenosynovitis, subacromial impingement/bursitis     Subjective:  Pt stated he had no issues over the weekend.  Lt shoulder is doing ok.      Patient's Living Environment/PLOF: Pt lives with spouse and son, has daughter in [employer].  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, RN  Patient's Therapy Goals: to return to baseline/PLOF for home & previous activities, yardwork, home maintenance w/o limitation     Pain:  Intensity: 2/10            Location: Lt Shld, surgical portals , and subpectoral surgical incision            Duration: Intermittent            Aggravating Factor: initial movement            Alleviating Factor:  OTC meds    HPI:  Pt had Lt RTCT for awhile (degenerative) & previous Lt distal biceps repair, found to have large spur  Social Factors:  1-2 personal factors &/or comorbidities     Precautions:  Per Protocol, RTW:  Tentative 8-5-24 (Pt reports there is no light duty at work)     Objective     Palpation: Lt Shld, surgical portals , and  subpectoral surgical incision + tenderness, well healed portals & incision    Treatments:    Manual:  PROM into shld flexion, and ER at neutral, pt supine, light distraction, STM near surgical incision and portals, cervical distraction, upper trap stretching, scapular mobs in right sidelying       PHASE II:  Pt will be 10 Wks post op on 6-14-24 & allowed to start Phase II    Assessment:     Pt stated no pain before or during treatment today, just stiffness.  Manual performed as noted above and per protocol guidelines.  No pain during treatment, only reports of stiffness.        Plan: Continue per POC, working on progression towards FROM until cleared til next phase.

## 2024-05-30 ENCOUNTER — TREATMENT (OUTPATIENT)
Dept: PHYSICAL THERAPY | Facility: CLINIC | Age: 59
End: 2024-05-30
Payer: COMMERCIAL

## 2024-05-30 DIAGNOSIS — M75.122 NONTRAUMATIC COMPLETE TEAR OF LEFT ROTATOR CUFF: ICD-10-CM

## 2024-05-30 PROCEDURE — 97140 MANUAL THERAPY 1/> REGIONS: CPT | Mod: GP

## 2024-05-30 NOTE — PROGRESS NOTES
Physical Therapy Treatment Note     Patient Name: Arian Forde  MRN: [id number]  Today's Date: 5/30/2024     Time Calculation  Start Time: 1000  Stop Time: 1045  Time Calculation (min): 45 min  PT Therapeutic Procedures Time Entry  Manual Therapy Time Entry: 45  Visit: 5/MN  Referred by:  Garielle Lopresti, PA-C, (Dr. Horacio Mukherjee, surgeon)     Insurance:  [facility]   Certification Dates:  5-20-24 to 9-9-24  Current Problem: [ M75.122]  Nontraumatic complete tear of left rotator cuff   1. Nontraumatic complete tear of left rotator cuff  Follow Up In Physical Therapy               Surgery: s/p Lt shoulder arthroscopy, RTCR (subscapularis & supraspinatus), extensive intra-articular debridement & synovectomy, SAD, acromioplasty & open subpectoral biceps tenodesis on 4/5/24     Operative Findings: Full-thickness supraspinatus tear, full-thickness upper border subscapularis tear, degenerative SLAP tear and biceps tenosynovitis, subacromial impingement/bursitis     Subjective:  Pt stated Lt shld is doing ok, just achy.      Patient's Living Environment/PLOF: Pt lives with spouse and son, has daughter in [employer].  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, RN  Patient's Therapy Goals: to return to baseline/PLOF for home & previous activities, yardwork, home maintenance w/o limitation     Pain:  Intensity: 2/10            Location: Lt Shld, surgical portals , and subpectoral surgical incision            Duration: Intermittent            Aggravating Factor: initial movement            Alleviating Factor:  OTC meds    HPI:  Pt had Lt RTCT for awhile (degenerative) & previous Lt distal biceps repair, found to have large spur  Social Factors:  1-2 personal factors &/or comorbidities     Precautions:  Per Protocol, RTW:  Tentative 8-5-24 (Pt reports there is no light duty at work)     Objective     Palpation: Lt Shld, surgical portals , and subpectoral surgical incision +  tenderness, well healed portals & incision    Treatments:    Manual:  PROM into shld flexion, and ER at neutral, pt supine, light distraction, STM near surgical incision and portals, cervical distraction, upper trap stretching, scapular mobs in right sidelying, PA mobs C6-T5       PHASE II:  Pt will be 10 Wks post op on 6-14-24 & allowed to start Phase II    Assessment:     Pt stated no pain before or during treatment today, just stiffness. ROM is improving, visual observation he was about 120 degrees shld flex.  Manual performed as noted above and per protocol guidelines.  No pain during treatment, only reports of stiffness.        Plan: Continue per POC, working on progression towards FROM until cleared til next phase.

## 2024-06-03 ENCOUNTER — TREATMENT (OUTPATIENT)
Dept: PHYSICAL THERAPY | Facility: CLINIC | Age: 59
End: 2024-06-03
Payer: COMMERCIAL

## 2024-06-03 DIAGNOSIS — M75.122 NONTRAUMATIC COMPLETE TEAR OF LEFT ROTATOR CUFF: Primary | ICD-10-CM

## 2024-06-03 PROCEDURE — 97140 MANUAL THERAPY 1/> REGIONS: CPT | Mod: GP

## 2024-06-03 NOTE — PROGRESS NOTES
Physical Therapy Treatment Note     Patient Name: Arian Forde  MRN: [id number]  Today's Date: 6/3/2024     Time Calculation  Start Time: 0700  Stop Time: 0845  Time Calculation (min): 105 min  PT Therapeutic Procedures Time Entry  Manual Therapy Time Entry: 60  Visit: 6/MN  Referred by:  Garielle Lopresti, PA-C, (Dr. Horacio Mukherjee, surgeon)     Insurance:  [facility]   Certification Dates:  5-20-24 to 9-9-24  Current Problem: [ M75.122]  Nontraumatic complete tear of left rotator cuff   1. Nontraumatic complete tear of left rotator cuff  Follow Up In Physical Therapy            Surgery: s/p Lt shoulder arthroscopy, RTCR (subscapularis & supraspinatus), extensive intra-articular debridement & synovectomy, SAD, acromioplasty & open subpectoral biceps tenodesis on 4/5/24     Operative Findings: Full-thickness supraspinatus tear, full-thickness upper border subscapularis tear, degenerative SLAP tear and biceps tenosynovitis, subacromial impingement/bursitis     Subjective:  Pt stated Lt shld is sore between the open surgical incision & the surgical portal superior to this.        Patient's Living Environment/PLOF: Pt lives with spouse and son, has daughter in [employer].  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, RN  Patient's Therapy Goals: to return to baseline/PLOF for home & previous activities, yardwork, home maintenance w/o limitation     Pain:  Intensity: 2-3/10            Location: Lt Shld, surgical portals , and subpectoral surgical incision            Duration: Intermittent            Aggravating Factor: movement, position            Alleviating Factor:  OTC meds    HPI:  Pt had Lt RTCT for awhile (degenerative) & previous Lt distal biceps repair, found to have large spur  Social Factors:  1-2 personal factors &/or comorbidities     Precautions:  Per Protocol, RTW:  Tentative 8-5-24 (Pt reports there is no light duty at work)     Objective     Palpation: Lt Shld,  surgical portals , subpectoral surgical incision & surgical portal just superior to this + tenderness  Treatments:    PROM:  Lt Shld Flex 132    Manual:  PROM into shld flexion, and ER at neutral, pt supine, light distraction, STM near surgical incision and portals, cervical distraction, upper trap stretching, scapular mobs in right sidelying, PA mobs C6-T5       PHASE II:  Pt will be 10 Wks post op on 6-14-24 & allowed to start Phase II    Assessment:     Pt stated mild pain at end range flex, resolving after manual completed.  He continues to report cervical stiffness. ROM is improving, as noted above.  Manual performed as noted above and per protocol guidelines.  He will be able to transition to phase II in 11 days.        Plan: Continue per POC, working on progression towards FROM until cleared til next phase.

## 2024-06-05 ENCOUNTER — TREATMENT (OUTPATIENT)
Dept: PHYSICAL THERAPY | Facility: CLINIC | Age: 59
End: 2024-06-05
Payer: COMMERCIAL

## 2024-06-05 DIAGNOSIS — M75.122 NONTRAUMATIC COMPLETE TEAR OF LEFT ROTATOR CUFF: ICD-10-CM

## 2024-06-05 DIAGNOSIS — M75.122 NONTRAUMATIC COMPLETE TEAR OF ROTATOR CUFF, LEFT: Primary | ICD-10-CM

## 2024-06-05 PROCEDURE — 97140 MANUAL THERAPY 1/> REGIONS: CPT | Mod: GP

## 2024-06-05 NOTE — PROGRESS NOTES
Physical Therapy Treatment Note     Patient Name: Arian Forde  MRN: [id number]  Today's Date: 6/5/2024     Time Calculation  Start Time: 0745  Stop Time: 0830  Time Calculation (min): 45 min  PT Therapeutic Procedures Time Entry  Manual Therapy Time Entry: 45  Visit: 7/MN  Referred by:  Garielle Lopresti, PA-C, (Dr. Horacio Mukherjee, surgeon)     Insurance:  [facility]   Certification Dates:  5-20-24 to 9-9-24    Current Problem: [ M75.122]  Nontraumatic complete tear of left rotator cuff   1. Nontraumatic complete tear of rotator cuff, left        2. Nontraumatic complete tear of left rotator cuff  Follow Up In Physical Therapy            Surgery: s/p Lt shoulder arthroscopy, RTCR (subscapularis & supraspinatus), extensive intra-articular debridement & synovectomy, SAD, acromioplasty & open subpectoral biceps tenodesis on 4/5/24     Operative Findings: Full-thickness supraspinatus tear, full-thickness upper border subscapularis tear, degenerative SLAP tear and biceps tenosynovitis, subacromial impingement/bursitis     Subjective:  Pt stated Lt shld is sore between the open surgical incision & the surgical portal superior to this.        Patient's Living Environment/PLOF: Pt lives with spouse and son, has daughter in [employer].  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, RN    Patient's Therapy Goals: to return to baseline/PLOF for home & previous activities, yardwork, home maintenance w/o limitation     Pain:  Intensity: 2-3/10            Location: Lt Shld, surgical portals , and subpectoral surgical incision            Duration: Intermittent            Aggravating Factor: movement, position            Alleviating Factor:  OTC meds    HPI:  Pt had Lt RTCT for awhile (degenerative) & previous Lt distal biceps repair, found to have large spur  Social Factors:  1-2 personal factors &/or comorbidities     Precautions:  Per Protocol, RTW:  Tentative 8-5-24 (Pt reports there is no  light duty at work)     Objective     Palpation: Lt Shld, surgical portals , subpectoral surgical incision & surgical portal just superior to this + tenderness  Treatments:    PROM:  Lt Shld Flex 136, ER@ neutral 56    Manual:  PROM into shld flexion, and ER at neutral, pt supine, light distraction, STM near surgical incision and portals, cervical distraction, upper trap stretching, scapular mobs in right sidelying, PA mobs C6-T5       PHASE II:  Pt will be 10 Wks post op on 6-14-24 & allowed to start Phase II    Assessment:     Pt stated mild pain at end range flex, resolving after manual completed.  He continues to report cervical stiffness but it's improving. ROM is improving, as noted above.  Manual performed as noted above and per protocol guidelines.  He will be able to transition to phase II in 9 days.        Plan: Continue per POC, working on progression towards FROM until cleared til next phase.

## 2024-06-07 ENCOUNTER — TREATMENT (OUTPATIENT)
Dept: PHYSICAL THERAPY | Facility: CLINIC | Age: 59
End: 2024-06-07
Payer: COMMERCIAL

## 2024-06-07 DIAGNOSIS — M75.122 NONTRAUMATIC COMPLETE TEAR OF LEFT ROTATOR CUFF: Primary | ICD-10-CM

## 2024-06-07 PROCEDURE — 97140 MANUAL THERAPY 1/> REGIONS: CPT | Mod: GP

## 2024-06-07 NOTE — PROGRESS NOTES
Physical Therapy Treatment Note     Patient Name: Arian Forde  MRN: [id number]  Today's Date: 6/7/2024     Time Calculation  Start Time: 0700  Stop Time: 0800  Time Calculation (min): 60 min  PT Therapeutic Procedures Time Entry  Manual Therapy Time Entry: 60    Visit: 8/MN  Referred by:  Garielle Lopresti, PA-C, (Dr. Horacio Mukherjee, surgeon)     Insurance:  [facility]   Certification Dates:  5-20-24 to 9-9-24    Current Problem: [ M75.122]  Nontraumatic complete tear of left rotator cuff   1. Nontraumatic complete tear of left rotator cuff  Follow Up In Physical Therapy            Surgery: s/p Lt shoulder arthroscopy, RTCR (subscapularis & supraspinatus), extensive intra-articular debridement & synovectomy, SAD, acromioplasty & open subpectoral biceps tenodesis on 4/5/24     Operative Findings: Full-thickness supraspinatus tear, full-thickness upper border subscapularis tear, degenerative SLAP tear and biceps tenosynovitis, subacromial impingement/bursitis     Subjective:  Pt stated Lt shld is sore between the open surgical incision & the surgical portal superior to this.        Patient's Living Environment/PLOF: Pt lives with spouse and son, has daughter in [employer].  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, RN    Patient's Therapy Goals: to return to baseline/PLOF for home & previous activities, yardwork, home maintenance w/o limitation     Pain:  Intensity: 2-3/10            Location: Rt Cervical C2-C5            Duration: Intermittent            Aggravating Factor: movement, position            Alleviating Factor:  OTC meds    HPI:  Pt had Lt RTCT for awhile (degenerative) & previous Lt distal biceps repair, found to have large spur  Social Factors:  1-2 personal factors &/or comorbidities     Precautions:  Per Protocol, RTW:  Tentative 8-5-24 (Pt reports there is no light duty at work)     Objective     Palpation: Lt Shld, surgical portals , subpectoral surgical  incision & surgical portal just superior to this + tenderness  Treatments:    PROM:  Lt Shld Flex 144, ER@ neutral 67    Manual:  PROM into shld flexion, and ER at neutral, pt supine, light distraction, cervical distraction, upper trap stretching, scapular mobs in right sidelying, PA mobs C6-T5, seated CTJ grade V manip (B)      PHASE II:  Pt will be 10 Wks post op on 6-14-24 & allowed to start Phase II    Assessment:     Pt's ROM is improving as noted above.  He has mild pain at end range flex, resolving after manual completed,no lingering soreness.  He had increased Rt sided cervical soreness today.  Tried to focus on this area to calm down symptoms.  Manual performed as noted above and per protocol guidelines.  He will be able to transition to phase II in 7 days.  Decreased symptoms post treatment      Plan: Continue per POC, working on progression towards FROM until cleared til next phase.

## 2024-06-10 ENCOUNTER — TREATMENT (OUTPATIENT)
Dept: PHYSICAL THERAPY | Facility: CLINIC | Age: 59
End: 2024-06-10
Payer: COMMERCIAL

## 2024-06-10 DIAGNOSIS — M75.122 NONTRAUMATIC COMPLETE TEAR OF LEFT ROTATOR CUFF: Primary | ICD-10-CM

## 2024-06-10 PROCEDURE — 97110 THERAPEUTIC EXERCISES: CPT | Mod: GP

## 2024-06-10 PROCEDURE — 97140 MANUAL THERAPY 1/> REGIONS: CPT | Mod: GP

## 2024-06-10 NOTE — PROGRESS NOTES
Physical Therapy Treatment Note     Patient Name: Arian Forde  MRN: [id number]  Today's Date: 6/10/2024     Time Calculation  Start Time: 0700  Stop Time: 0745  Time Calculation (min): 45 min  PT Therapeutic Procedures Time Entry  Manual Therapy Time Entry: 35  Therapeutic Exercise Time Entry: 10    Visit: 9/MN  Referred by:  Garielle Lopresti, PA-C, (Dr. Horacio Mukherjee, surgeon)     Insurance:  [facility]   Certification Dates:  5-20-24 to 9-9-24    Current Problem: [ M75.122]  Nontraumatic complete tear of left rotator cuff     1. Nontraumatic complete tear of left rotator cuff  Follow Up In Physical Therapy            Surgery: s/p Lt shoulder arthroscopy, RTCR (subscapularis & supraspinatus), extensive intra-articular debridement & synovectomy, SAD, acromioplasty & open subpectoral biceps tenodesis on 4/5/24     Operative Findings: Full-thickness supraspinatus tear, full-thickness upper border subscapularis tear, degenerative SLAP tear and biceps tenosynovitis, subacromial impingement/bursitis     Subjective:  Pt stated his Rt neck was really sore on Saturday after stretching last Friday. But his Lt shoulder is doing well.      Patient's Living Environment/PLOF: Pt lives with spouse and son, has daughter in [employer].  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, RN    Patient's Therapy Goals: to return to baseline/PLOF for home & previous activities, yardwork, home maintenance w/o limitation     Pain:  Intensity: 2-3/10            Location: Rt Cervical C2-C5            Duration: Intermittent            Aggravating Factor: movement, position            Alleviating Factor:  OTC meds    HPI:  Pt had Lt RTCT for awhile (degenerative) & previous Lt distal biceps repair, found to have large spur  Social Factors:  1-2 personal factors &/or comorbidities     Precautions:  Per Protocol, RTW:  Tentative 8-5-24 (Pt reports there is no light duty at work)     Objective     Palpation: Lt Shld,  surgical portals , subpectoral surgical incision & surgical portal just superior to this + tenderness  Treatments:    Manual:  PROM into shld flexion, and ER at neutral, pt supine, light distraction, cervical distraction, upper trap stretching, scapular mobs in right sidelying, PA mobs C6-T5, only performed gentle mobs today due to soreness     PHASE II:  Pt will be 10 Wks post op on 6-14-24 & allowed to start Phase II    Assessment:     Pt's ROM continues to improving as noted above.  He has mild pain at end range flex, resolving after manual completed, no lingering soreness.  He had increased Rt sided cervical soreness over the weekend.  Manual performed as noted above and per protocol guidelines.  He will be able to transition to phase II in 4 days.  Decreased symptoms post treatment.  F/U with surgeon July 1.      Plan: Continue per POC, working on progression towards FROM until cleared til next phase.

## 2024-06-12 ENCOUNTER — TREATMENT (OUTPATIENT)
Dept: PHYSICAL THERAPY | Facility: CLINIC | Age: 59
End: 2024-06-12
Payer: COMMERCIAL

## 2024-06-12 ENCOUNTER — APPOINTMENT (OUTPATIENT)
Dept: PHYSICAL THERAPY | Facility: CLINIC | Age: 59
End: 2024-06-12
Payer: COMMERCIAL

## 2024-06-12 DIAGNOSIS — M75.122 NONTRAUMATIC COMPLETE TEAR OF LEFT ROTATOR CUFF: ICD-10-CM

## 2024-06-12 PROCEDURE — 97110 THERAPEUTIC EXERCISES: CPT | Mod: GP

## 2024-06-12 PROCEDURE — 97140 MANUAL THERAPY 1/> REGIONS: CPT | Mod: GP

## 2024-06-12 NOTE — PROGRESS NOTES
Physical Therapy Treatment Note     Patient Name: Arian Forde  MRN: [id number]  Today's Date: 6/12/2024     Time Calculation  Start Time: 0700  Stop Time: 0800  Time Calculation (min): 60 min  PT Therapeutic Procedures Time Entry  Manual Therapy Time Entry: 45  Therapeutic Exercise Time Entry: 15    Visit: 10/MN  Referred by:  Garielle Lopresti, PA-C, (Dr. Horacio Mukherjee, surgeon)     Insurance:  [facility]   Certification Dates:  5-20-24 to 9-9-24    Current Problem: [ M75.122]  Nontraumatic complete tear of left rotator cuff     1. Nontraumatic complete tear of left rotator cuff  Follow Up In Physical Therapy            Surgery: s/p Lt shoulder arthroscopy, RTCR (subscapularis & supraspinatus), extensive intra-articular debridement & synovectomy, SAD, acromioplasty & open subpectoral biceps tenodesis on 4/5/24     Operative Findings: Full-thickness supraspinatus tear, full-thickness upper border subscapularis tear, degenerative SLAP tear and biceps tenosynovitis, subacromial impingement/bursitis     Subjective:  Pt stated his Rt neck was is finally calmed down from last week.  He was exhausted yesterday, did not get much done.        Patient's Living Environment/PLOF: Pt lives with spouse and son, has daughter in [employer].  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, RN    Patient's Therapy Goals: to return to baseline/PLOF for home & previous activities, yardwork, home maintenance w/o limitation     Pain:  Intensity: 2-3/10            Location: Rt Cervical C2-C5            Duration: Intermittent            Aggravating Factor: movement, position            Alleviating Factor:  OTC meds    HPI:  Pt had Lt RTCT for awhile (degenerative) & previous Lt distal biceps repair, found to have large spur  Social Factors:  1-2 personal factors &/or comorbidities     Precautions:  Per Protocol, RTW:  Tentative 8-5-24 (Pt reports there is no light duty at work)     Objective     AAROM:  Lt shld  Flex 152, Abd 125, ER @ 90, 75, IR  to beltline    Palpation: Lt Shld, surgical portals , subpectoral surgical incision & surgical portal just superior to this + tenderness  Treatments:    Exercises:      Table Slides Flex, ER, Scaption, 10 x 5 sec each  Supine Flex 10 x 5 sec  Supine Scaption 10 x 5 sec  ER @ Neutral 10 x 5 sec    Manual:  PROM into shld flexion, and ER at neutral and 45 degrees abd, pt supine, light distraction, cervical distraction, upper trap stretching, scapular mobs in right sidelying, PA mobs C6-T5     PHASE II:  Pt will be 10 Wks post op on 6-14-24 & allowed to start Phase II    Assessment:     Pt's ROM  was the best it's been to date, as noted above.  He has mild pain at end ranges, resolving after manual completed, no lingering soreness.  Manual performed as noted above and per protocol guidelines.  Transitioned to phase II today, 2 days early as he's been doing well w/o any issues.  Decreased symptoms post treatment.  F/U with surgeon July 1.      Plan: Continue per POC, working on progression towards FROM until cleared til next phase.

## 2024-06-14 ENCOUNTER — TREATMENT (OUTPATIENT)
Dept: PHYSICAL THERAPY | Facility: CLINIC | Age: 59
End: 2024-06-14
Payer: COMMERCIAL

## 2024-06-14 DIAGNOSIS — M75.122 NONTRAUMATIC COMPLETE TEAR OF LEFT ROTATOR CUFF: Primary | ICD-10-CM

## 2024-06-14 PROCEDURE — 97110 THERAPEUTIC EXERCISES: CPT | Mod: GP

## 2024-06-14 PROCEDURE — 97140 MANUAL THERAPY 1/> REGIONS: CPT | Mod: GP

## 2024-06-14 NOTE — PROGRESS NOTES
Physical Therapy Treatment Note     Patient Name: Arian Forde  MRN: [id number]  Today's Date: 6/14/2024     Time Calculation  Start Time: 0800  Stop Time: 0845  Time Calculation (min): 45 min  PT Therapeutic Procedures Time Entry  Manual Therapy Time Entry: 15  Therapeutic Exercise Time Entry: 30    Visit: 11/MN  Referred by:  Garielle Lopresti, PA-C, (Dr. Horacio Mukherjee, surgeon)     Insurance:  [facility]   Certification Dates:  5-20-24 to 9-9-24    Current Problem: [ M75.122]  Nontraumatic complete tear of left rotator cuff     1. Nontraumatic complete tear of left rotator cuff  Follow Up In Physical Therapy            Surgery: s/p Lt shoulder arthroscopy, RTCR (subscapularis & supraspinatus), extensive intra-articular debridement & synovectomy, SAD, acromioplasty & open subpectoral biceps tenodesis on 4/5/24     Operative Findings: Full-thickness supraspinatus tear, full-thickness upper border subscapularis tear, degenerative SLAP tear and biceps tenosynovitis, subacromial impingement/bursitis     Subjective:  Pt stated his Rt neck is doing well, Lt shoulder is just sore, no pain.          Patient's Living Environment/PLOF: Pt lives with spouse and son, has daughter in [employer].  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, RN    Patient's Therapy Goals: to return to baseline/PLOF for home & previous activities, yardwork, home maintenance w/o limitation     Pain:  Intensity: 2-3/10            Location: Rt Cervical C2-C5, Left Shoulder, anterior, diffus            Duration: Intermittent            Aggravating Factor: movement, position            Alleviating Factor:  OTC meds    HPI:  Pt had Lt RTCT for awhile (degenerative) & previous Lt distal biceps repair, found to have large spur  Social Factors:  1-2 personal factors &/or comorbidities     Precautions:  Per Protocol, RTW:  Tentative 8-5-24 (Pt reports there is no light duty at work)     Objective     AAROM:  Lt shld Flex 152, Abd  125, ER @ 90, 75, IR  to beltline    Palpation: Lt Shld, surgical portals , subpectoral surgical incision & surgical portal just superior to this + tenderness  Treatments:    Exercises:      Pulleys, 3 min, Flex  Table Slides Flex, ER, Scaption, 10 x 5 sec each  Supine Flex 10 x 5 sec  Wall Slides, 15 x , Flex, Circles, CW, CCW each  Supine Scaption 10 x 5 sec  ER @ Neutral 10 x 5 sec  Seated Bolster Shld Flex, and Scaption, 10 x each    Manual:  PROM into shld flexion, and ER at neutral and 45 degrees abd, pt supine, light distraction, cervical distraction, upper trap stretching, scapular mobs in right sidelying, PA mobs C6-T5     PHASE II:  Pt will be 10 Wks post op on 6-14-24 & allowed to start Phase II    Assessment:     Pt  started phase II exercises as noted.  He was challenged with most, requiring use assistance from a support surface or external support to move the Lt UE against gravity.  Manual performed as noted above and per protocol guidelines.   Decreased symptoms post treatment.  F/U with surgeon July 1.      Plan: Continue per POC, working on progression towards FROM until cleared til next phase.

## 2024-06-17 ENCOUNTER — TREATMENT (OUTPATIENT)
Dept: PHYSICAL THERAPY | Facility: CLINIC | Age: 59
End: 2024-06-17
Payer: COMMERCIAL

## 2024-06-17 DIAGNOSIS — M75.122 NONTRAUMATIC COMPLETE TEAR OF LEFT ROTATOR CUFF: ICD-10-CM

## 2024-06-17 PROCEDURE — 97110 THERAPEUTIC EXERCISES: CPT | Mod: GP

## 2024-06-17 PROCEDURE — 97140 MANUAL THERAPY 1/> REGIONS: CPT | Mod: GP

## 2024-06-17 NOTE — PROGRESS NOTES
Physical Therapy Treatment Note     Patient Name: Arian Forde  MRN: [id number]  Today's Date: 6/17/2024     Time Calculation  Start Time: 0700  Stop Time: 0745  Time Calculation (min): 45 min  PT Therapeutic Procedures Time Entry  Manual Therapy Time Entry: 15  Therapeutic Exercise Time Entry: 30    Visit: 12/MN  Referred by:  Garielle Lopresti, PA-C, (Dr. Horacio Mukherjee, surgeon)  Insurance:  [facility]   Certification Dates:  5-20-24 to 9-9-24    Current Problem: [ M75.122]  Nontraumatic complete tear of left rotator cuff     1. Nontraumatic complete tear of left rotator cuff  Follow Up In Physical Therapy            Surgery: s/p Lt shoulder arthroscopy, RTCR (subscapularis & supraspinatus), extensive intra-articular debridement & synovectomy, SAD, acromioplasty & open subpectoral biceps tenodesis on 4/5/24     Operative Findings: Full-thickness supraspinatus tear, full-thickness upper border subscapularis tear, degenerative SLAP tear and biceps tenosynovitis, subacromial impingement/bursitis     Subjective:  Pt stated he had a bout of dizziness over the weekend.  His neck was tight over the weekend.           Patient's Living Environment/PLOF: Pt lives with spouse and son, has daughter in [employer].  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, RN    Patient's Therapy Goals: to return to baseline/PLOF for home & previous activities, yardwork, home maintenance w/o limitation     Pain:  Intensity: 2-3/10            Location: Rt Cervical C2-C5, Left Shoulder, anterior, diffus            Duration: Intermittent            Aggravating Factor: movement, position            Alleviating Factor:  OTC meds    HPI:  Pt had Lt RTCT for awhile (degenerative) & previous Lt distal biceps repair, found to have large spur  Social Factors:  1-2 personal factors &/or comorbidities     Precautions:  Per Protocol, RTW:  Tentative 8-5-24 (Pt reports there is no light duty at work)     Objective     AAROM:  Lt  shld Flex 152, Abd 125, ER @ 90, 75, IR  to beltline    Palpation: Lt Shld, surgical portals , subpectoral surgical incision & surgical portal just superior to this + tenderness  Treatments:    Exercises:      Pulleys, 3 min, Flex  Table Slides Flex, ER, Scaption, 10 x 5 sec each  Std Flex 10 x 5 sec  Wall Slides, 15 x , Flex, Circles, CW, CCW each  Std Scaption 10 x 5 sec  ER @ Neutral 10 x 5 sec  Seated Bolster Shld Flex, and Scaption, 10 x each    Manual:  PROM into shld flexion, and ER at neutral and 45 degrees abd, pt supine, light distraction, cervical distraction, upper trap stretching, scapular mobs in right sidelying, PA mobs C6-T5    F/U with surgeon July 1    Assessment:     Pt started phase II exercises last week.  He stated exercises were easier today but had some difficulty with the eccentric portion of the exercise.  He is now able to perform wand exercises with the Lt UE against gravity.  Manual performed as noted above and per protocol guidelines.  Decreased symptoms post treatment.        Plan: Continue per POC, working on progression towards FROM until cleared til next phase.

## 2024-06-19 ENCOUNTER — TREATMENT (OUTPATIENT)
Dept: PHYSICAL THERAPY | Facility: CLINIC | Age: 59
End: 2024-06-19
Payer: COMMERCIAL

## 2024-06-19 DIAGNOSIS — M75.122 NONTRAUMATIC COMPLETE TEAR OF LEFT ROTATOR CUFF: ICD-10-CM

## 2024-06-19 PROCEDURE — 97110 THERAPEUTIC EXERCISES: CPT | Mod: GP

## 2024-06-19 PROCEDURE — 97140 MANUAL THERAPY 1/> REGIONS: CPT | Mod: GP

## 2024-06-19 NOTE — PROGRESS NOTES
Physical Therapy Treatment Note     Patient Name: Arian Forde  MRN: [id number]  Today's Date: 6/19/2024     Time Calculation  Start Time: 0830  Stop Time: 0915  Time Calculation (min): 45 min  PT Therapeutic Procedures Time Entry  Manual Therapy Time Entry: 15  Therapeutic Exercise Time Entry: 30    Visit: 13/MN  Referred by:  Garielle Lopresti, PA-C, (Dr. Horacio Mukherjee, surgeon)  Insurance:  [facility]   Certification Dates:  5-20-24 to 9-9-24    Current Problem: [ M75.122]  Nontraumatic complete tear of left rotator cuff     1. Nontraumatic complete tear of left rotator cuff  Follow Up In Physical Therapy            Surgery: s/p Lt shoulder arthroscopy, RTCR (subscapularis & supraspinatus), extensive intra-articular debridement & synovectomy, SAD, acromioplasty & open subpectoral biceps tenodesis on 4/5/24     Operative Findings: Full-thickness supraspinatus tear, full-thickness upper border subscapularis tear, degenerative SLAP tear and biceps tenosynovitis, subacromial impingement/bursitis     Subjective:  Pt stated he had a bout of dizziness over the weekend.  His neck was tight over the weekend.           Patient's Living Environment/PLOF: Pt lives with spouse and son, has daughter in [employer].  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, RN    Patient's Therapy Goals: to return to baseline/PLOF for home & previous activities, yardwork, home maintenance w/o limitation     Pain:  Intensity: 2-3/10            Location: Rt Cervical C2-C5, Left Shoulder, anterior, diffus            Duration: Intermittent            Aggravating Factor: movement, position            Alleviating Factor:  OTC meds    HPI:  Pt had Lt RTCT for awhile (degenerative) & previous Lt distal biceps repair, found to have large spur  Social Factors:  1-2 personal factors &/or comorbidities     Precautions:  Per Protocol, RTW:  Tentative 8-5-24 (Pt reports there is no light duty at work)  Protocol:  (Large RTCR) Can  begin shoulder isometrics at 12 Wks, shoulder resistance exercises at Wk 16     Objective     AAROM:  Lt shld Flex 152, Abd 125, ER @ 90, 75, IR  to beltline    Palpation: Lt Shld, surgical portals , subpectoral surgical incision & surgical portal just superior to this + tenderness  Treatments:    Exercises:      Pulleys, 3 min, Flex  Table Slides Flex, ER, Scaption, 10 x 5 sec each  Std Flex 10 x 5 sec  Wall Slides, 15 x , Flex, Circles, CW, CCW each  Std Scaption 10 x 5 sec  ER @ Neutral 10 x 5 sec  Seated Bolster Shld Flex, and Scaption, 10 x each  Doorway Stretch.  3 x 15 sec hold  UBE, 2 min fwd/bkwd  Prone Scapular Retraction, 5 x 3 sec  Resisted Isometrics, supine, all direction, 3 sec hold    Manual:  PROM into shld flexion, and ER at neutral and 45 degrees abd, pt supine, light distraction, cervical distraction, upper trap stretching, scapular mobs in right sidelying, PA mobs C6-T5    F/U with surgeon July 1    Assessment:     Pt is tolerating phase II exercises well.  He will be 11 wks post op on Friday.  He was unable to perform prone W's, but prone scapular retraction was tolerated well. He stated active shoulder flexion is easier, scaption is still challenging.   He is now able to perform wand exercises with the Lt UE against gravity.  Manual performed as noted above and per protocol guidelines.  Decreased symptoms post treatment.        Plan: Continue per POC, working on progression towards FROM until cleared til next phase.

## 2024-06-21 ENCOUNTER — APPOINTMENT (OUTPATIENT)
Dept: PHYSICAL THERAPY | Facility: CLINIC | Age: 59
End: 2024-06-21
Payer: COMMERCIAL

## 2024-06-24 ENCOUNTER — TREATMENT (OUTPATIENT)
Dept: PHYSICAL THERAPY | Facility: CLINIC | Age: 59
End: 2024-06-24
Payer: COMMERCIAL

## 2024-06-24 DIAGNOSIS — M75.122 NONTRAUMATIC COMPLETE TEAR OF LEFT ROTATOR CUFF: Primary | ICD-10-CM

## 2024-06-24 PROCEDURE — 97140 MANUAL THERAPY 1/> REGIONS: CPT | Mod: GP

## 2024-06-24 PROCEDURE — 97110 THERAPEUTIC EXERCISES: CPT | Mod: GP
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
## 2024-06-26 ENCOUNTER — TREATMENT (OUTPATIENT)
Dept: PHYSICAL THERAPY | Facility: CLINIC | Age: 59
End: 2024-06-26
Payer: COMMERCIAL

## 2024-06-26 DIAGNOSIS — M75.122 NONTRAUMATIC COMPLETE TEAR OF LEFT ROTATOR CUFF: Primary | ICD-10-CM

## 2024-06-26 PROCEDURE — 97110 THERAPEUTIC EXERCISES: CPT | Mod: GP

## 2024-06-26 PROCEDURE — 97140 MANUAL THERAPY 1/> REGIONS: CPT | Mod: GP

## 2024-06-26 NOTE — PROGRESS NOTES
Physical Therapy Treatment Note       Patient Name: Arian Forde  MRN: [id number]  Today's Date: 6/26/2024     Time Calculation  Start Time: 0830  Stop Time: 0915  Time Calculation (min): 45 min  PT Therapeutic Procedures Time Entry  Manual Therapy Time Entry: 15  Therapeutic Exercise Time Entry: 30    Visit: 15/MN  Referred by:  Garielle Lopresti, PA-C, (Dr. Horacio Mukherjee, surgeon)  Insurance:  [facility]   Certification Dates:  5-20-24 to 9-9-24    Current Problem: [ M75.122]    1. Nontraumatic complete tear of left rotator cuff  Follow Up In Physical Therapy            Surgery: s/p Lt shoulder arthroscopy, RTCR (subscapularis & supraspinatus), extensive intra-articular debridement & synovectomy, SAD, acromioplasty & open subpectoral biceps tenodesis on 4/5/24     Operative Findings: Full-thickness supraspinatus tear, full-thickness upper border subscapularis tear, degenerative SLAP tear and biceps tenosynovitis, subacromial impingement/bursitis     Subjective:  Pt stated his Left shoulder feels a lot looser after the last several days.      Patient's Living Environment/PLOF: Pt lives with spouse and son, has daughter in [employer].  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, RN    Patient's Therapy Goals: to return to baseline/PLOF for home & previous activities, yardwork, home maintenance w/o limitation     Pain:  Intensity: 2-3/10            Location: Rt Cervical C2-C5, Left Shoulder, anterior, diffuse            Duration: Intermittent            Aggravating Factor: movement, position            Alleviating Factor:  OTC meds    HPI:  Pt had Lt RTCT for awhile (degenerative) & previous Lt distal biceps repair, found to have large spur  Social Factors:  1-2 personal factors &/or comorbidities     Precautions:  Per Protocol, RTW:  Tentative 8-5-24 (Pt reports there is no light duty at work)  Protocol:  (Large RTCR) Can begin shoulder isometrics at 12 Wks, shoulder resistance exercises at  Wk 16     Objective     AAROM:  Will measure on Friday's visit prior to his follow up with Dr. Mukherjee    Treatments:    Exercises:      Pulleys, 4 min, Flex, Scaption, 2 min each  Table Slides Flex, ER, Scaption, 10 x 5 sec each  Std Flex 10 x 5 sec  Wall Slides, 15 x , Flex, Circles, CW, CCW each  Std Scaption 10 x 5 sec  ER @ Neutral 10 x 5 sec  Doorway Stretch.  3 x 15 sec hold  UBE, 2 min fwd/bkwd  Prone Scapular Retraction, 5 x 3 sec  Prone Flex & Scaption  Resisted Isometrics, supine, all direction, 3 sec hold  Rows, 2 x 10, blue  Shoulder Extension, 2 x 10, blue    Manual:  PROM into shld flexion, and ER at neutral and 45 degrees abd, pt supine, light distraction, cervical distraction, upper trap stretching, scapular mobs in right sidelying, PA mobs C6-T5    F/U with surgeon July 1, this Monday    Assessment:     Pt continues to tolerate phase II exercises well.  He will be 12 wks post op this Friday.  He is tolerating wall slides & stretches much better. ROM WNL's, lacking terminal ranges.   Manual performed as noted above and per protocol guidelines.  Decreased symptoms post treatment.        Plan: Continue per POC, working on progression towards FROM until cleared til next phase.

## 2024-06-28 ENCOUNTER — TREATMENT (OUTPATIENT)
Dept: PHYSICAL THERAPY | Facility: CLINIC | Age: 59
End: 2024-06-28
Payer: COMMERCIAL

## 2024-06-28 DIAGNOSIS — M75.122 NONTRAUMATIC COMPLETE TEAR OF LEFT ROTATOR CUFF: ICD-10-CM

## 2024-06-28 PROCEDURE — 97110 THERAPEUTIC EXERCISES: CPT | Mod: GP

## 2024-06-28 PROCEDURE — 97140 MANUAL THERAPY 1/> REGIONS: CPT | Mod: GP

## 2024-06-28 NOTE — PROGRESS NOTES
Physical Therapy Treatment Note       Patient Name: Arian Forde  MRN: [id number]  Today's Date: 6/28/2024     Time Calculation  Start Time: 0800  Stop Time: 0845  Time Calculation (min): 45 min  PT Therapeutic Procedures Time Entry  Manual Therapy Time Entry: 15  Therapeutic Exercise Time Entry: 30    Visit: 16/MN  Referred by:  Garielle Lopresti, PA-C, (Dr. Horacio Mukherjee, surgeon)  Insurance:  [facility]   Certification Dates:  5-20-24 to 9-9-24    Current Problem: [ M75.122]    1. Nontraumatic complete tear of left rotator cuff  Follow Up In Physical Therapy            Surgery: s/p Lt shoulder arthroscopy, RTCR (subscapularis & supraspinatus), extensive intra-articular debridement & synovectomy, SAD, acromioplasty & open subpectoral biceps tenodesis on 4/5/24     Operative Findings: Full-thickness supraspinatus tear, full-thickness upper border subscapularis tear, degenerative SLAP tear and biceps tenosynovitis, subacromial impingement/bursitis     Subjective:  Pt stated his Left shoulder is doing well. He noticed his Lt scap is popping.      Patient's Living Environment/PLOF: Pt lives with spouse and son, has daughter in [employer].  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, RN    Patient's Therapy Goals: to return to baseline/PLOF for home & previous activities, yardwork, home maintenance w/o limitation     Pain:  Intensity: 2-3/10            Location: Rt Cervical C2-C5, Left Shoulder, anterior, diffuse            Duration: Intermittent            Aggravating Factor: movement, position            Alleviating Factor:  OTC meds    HPI:  Pt had Lt RTCT for awhile (degenerative) & previous Lt distal biceps repair, found to have large spur  Social Factors:  1-2 personal factors &/or comorbidities     Precautions:  Per Protocol, RTW:  Tentative 8-5-24 (Pt reports there is no light duty at work)  Protocol:  (Large RTCR) Can begin shoulder isometrics at 12 Wks, shoulder resistance exercises  at Wk 16     Objective     AAROM:  Lt shld Flex 162 supine 157 std, Abd 155 supine, 144 in std, ER @ 90, 75, IR @ 90 85, IR  to L2, Horizontal Add 40, shld extension 58     Exercises:      Pulleys, 4 min, Flex, Scaption, 2 min each  Table Slides Flex, ER, Scaption, 10 x 5 sec each  Std Flex 2 x10, 5 sec, 1 lb  Wall Slides, 15 x , Flex, Circles, CW, CCW each  Std Scaption 10 x 5 sec  ER @ Neutral 10 x 5 sec  Doorway Stretch.  3 x 15 sec hold  UBE, 2 min fwd/bkwd  Prone Scapular Retraction, 5 x 3 sec  Prone Flex & Scaption  ER/IR, blue, 3 sec hold, 2 x 10 sec  Rows, 2 x 10, blue  Shoulder Ext, 2 x 10, blue    Manual:  PROM into shld flexion, and ER at neutral and 45 degrees abd, pt supine, light distraction,  upper trap stretching, scapular mobs in right sidelying, PA mobs C6-T5    F/U with surgeon July 1, this Monday    Assessment:     Pt continues to tolerate phase II exercises well.  He is 12 wks post op today.  He is tolerating all activities well.  ROM WNL's, lacking terminal ranges.   Manual performed as noted above and per protocol guidelines.  Decreased symptoms post treatment.        Plan: Continue per POC, working on progression towards FROM until cleared til next phase.

## 2024-07-01 ENCOUNTER — TREATMENT (OUTPATIENT)
Dept: PHYSICAL THERAPY | Facility: CLINIC | Age: 59
End: 2024-07-01
Payer: COMMERCIAL

## 2024-07-01 ENCOUNTER — APPOINTMENT (OUTPATIENT)
Dept: ORTHOPEDIC SURGERY | Facility: CLINIC | Age: 59
End: 2024-07-01
Payer: COMMERCIAL

## 2024-07-01 VITALS — HEIGHT: 68 IN | WEIGHT: 185 LBS | BODY MASS INDEX: 28.04 KG/M2

## 2024-07-01 DIAGNOSIS — M25.812 SHOULDER IMPINGEMENT, LEFT: Primary | ICD-10-CM

## 2024-07-01 DIAGNOSIS — M75.122 NONTRAUMATIC COMPLETE TEAR OF LEFT ROTATOR CUFF: Primary | ICD-10-CM

## 2024-07-01 PROCEDURE — 99024 POSTOP FOLLOW-UP VISIT: CPT | Performed by: STUDENT IN AN ORGANIZED HEALTH CARE EDUCATION/TRAINING PROGRAM

## 2024-07-01 PROCEDURE — 97140 MANUAL THERAPY 1/> REGIONS: CPT | Mod: GP

## 2024-07-01 PROCEDURE — 97110 THERAPEUTIC EXERCISES: CPT | Mod: GP

## 2024-07-01 ASSESSMENT — PAIN - FUNCTIONAL ASSESSMENT: PAIN_FUNCTIONAL_ASSESSMENT: NO/DENIES PAIN

## 2024-07-01 NOTE — LETTER
July 1, 2024     Patient: Arian Forde   YOB: 1965   Date of Visit: 7/1/2024       To Whom it May Concern:    Arian Forde was seen in my clinic on 7/1/2024. He may return to work on 8/5/24 with a 20lb lifting, pushing, pulling restriction. Any questions or concerns please call us at [phone]          Sincerely,          Horacio Mukherjee MD        CC: No Recipients

## 2024-07-01 NOTE — PROGRESS NOTES
Physical Therapy Treatment Note       Patient Name: Arian Forde  MRN: [id number]  Today's Date: 7/1/2024     Time Calculation  Start Time: 0700  Stop Time: 0745  Time Calculation (min): 45 min  PT Therapeutic Procedures Time Entry  Manual Therapy Time Entry: 10  Therapeutic Exercise Time Entry: 35  Visit: 17/MN  Referred by:  Garielle Lopresti, PA-C, (Dr. Horacio Mukherjee, surgeon)  Insurance:  [facility]   Certification Dates:  5-20-24 to 9-9-24    Current Problem: [ M75.122]    1. Nontraumatic complete tear of left rotator cuff  Follow Up In Physical Therapy            Surgery: s/p Lt shoulder arthroscopy, RTCR (subscapularis & supraspinatus), extensive intra-articular debridement & synovectomy, SAD, acromioplasty & open subpectoral biceps tenodesis on 4/5/24     Operative Findings: Full-thickness supraspinatus tear, full-thickness upper border subscapularis tear, degenerative SLAP tear and biceps tenosynovitis, subacromial impingement/bursitis     Subjective:  Pt stated his Left shoulder was pretty sore on Saturday.      Patient's Living Environment/PLOF: Pt lives with spouse and son, has daughter in [employer].  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, RN    Patient's Therapy Goals: to return to baseline/PLOF for home & previous activities, yardwork, home maintenance w/o limitation     Pain:  Intensity: 1-2/10            Location: Left Shoulder, diffuse, more soreness than pain            Duration: Intermittent            Aggravating Factor: movement, position            Alleviating Factor:  OTC meds    HPI:  Pt had Lt RTCT for awhile (degenerative) & previous Lt distal biceps repair, found to have large spur  Social Factors:  1-2 personal factors &/or comorbidities     Precautions:  Per Protocol, RTW:  Tentative 8-5-24 (Pt reports there is no light duty at work)  Protocol:  (Large RTCR) Can begin shoulder isometrics at 12 Wks, shoulder resistance exercises at Wk 16     Objective      AAROM:  Lt shld Flex 170/PROM supine 158 std, Abd 155 supine, 144 in std, ER @ 90, 78, IR @ 90 85/90PROM, IR to L1, Horizontal Add 45, shld extension 64     Exercises:      Pulleys, 4 min, Flex, Scaption, 2 min each  Table Slides Flex, ER, Scaption, 10 x 5 sec each  Std Flex 2 x10, 5 sec, 1 lb  Wall Slides, 15 x , Flex, Circles, CW, CCW each  Std Scaption 10 x 5 sec, 1 lb  ER @ Neutral 10 x 5 sec, blue  Doorway Stretch.  3 x 15 sec hold  Prone Scapular Retraction, 5 x 3 sec  Prone Flex & Scaption  ER/IR, blue, 3 sec hold, 2 x 10 sec  Rows, 2 x 10, blue  Shoulder Ext, 2 x 10, blue  Cone Reach, 10 cones, 1 x   Serratus Stretch on bolster, 10 x     Manual:  PROM into shld flexion, and ER at neutral and 45 degrees abd, pt supine, light distraction,  upper trap stretching, scapular mobs in right sidelying, PA mobs C6-T5    F/U with surgeon July 1, today    Assessment:     Pt continues to tolerate phase II exercises well.  He sees the surgeon this afternoon.  He is tolerating all activities well at 12+ wks post op.  ROM WNL's, land approaching terminal ranges.   Manual performed as noted above and per protocol guidelines.  Decreased symptoms post treatment.        Plan: Continue per POC, working on progression towards FROM until cleared til next phase.

## 2024-07-01 NOTE — PROGRESS NOTES
PRIMARY CARE PHYSICIAN: Nakul Meyer MD    ORTHOPAEDIC POSTOPERATIVE VISIT    ASSESSMENT & PLAN    Impression: 58 y.o. male 12 week postop s/p Left shoulder arthroscopy, rotator cuff repair, intra-articular debridement and synovectomy, subacromial decompression and acromioplasty and open subpectoral biceps tenodesis done 4/5/24.    Plan:   Overall Arian is doing well.  He will continue working with physical therapy through the post-operative protocol for the above. We discussed his post-operative precautions and he expressed understanding. He will ice and rest the left shoulder as needed. Plan will be for Arian to remain out of work until August 5th; at that time he will return to work with a 20 pound lifting restriction.  He will then return to see me 3 months from now at the 6-month postoperative visit with a plan to return to work without restrictions.    I reviewed their postoperative timeline and plan with them. They understand the postoperative precautions and the treatment plan going forward.     Follow-Up: Patient will follow-up in 3 months, no x-rays    At the end of the visit, all questions were answered in full. The patient is in agreement with the plan and recommendations. They will call the office with any questions/concerns.      Note dictated with [employer] software. Completed without full typed error editing and sent to avoid delay.      SUBJECTIVE  CHIEF COMPLAINT: Post-op     HPI: Arian Forde is a 58 y.o. patient now 12 week postop status post Left shoulder arthroscopy, rotator cuff repair, intra-articular debridement and synovectomy, subacromial decompression and acromioplasty and open subpectoral biceps tenodesis done 4/5/24.  He is doing well overall. His pain is well controlled. He is improving well in therapy.      REVIEW OF SYSTEMS  Constitutional: See HPI for pain assessment, No significant recent weight gain or loss, no fever or chills  Cardiovascular: No chest  "pain, shortness of breath  Respiratory: No difficulty breathing, no cough  Gastrointestinal: No nausea, vomiting, diarrhea, constipation  Musculoskeletal: Noted in HPI, positive for pain, restricted motion, stiffness  Integumentary: No rashes, easy bruising or skin lesions  Neurological: No headache, no numbness or tingling in extremities  Psychiatric: No mood changes or memory changes. No social issues  Heme/Lymph: No excessive swelling, easy bruising or excessive bleeding  ENT: No hearing changes, no nosebleeds  Eyes: No vision changes    CURRENT MEDICATIONS:   Current Outpatient Medications   Medication Sig Dispense Refill    tiZANidine (Zanaflex) 4 mg tablet Take 1 tablet (4 mg) by mouth as needed at bedtime.       No current facility-administered medications for this visit.        OBJECTIVE    PHYSICAL EXAM      4/5/2024    11:50 AM 4/5/2024    12:05 PM 4/5/2024    12:20 PM 4/5/2024    12:39 PM 4/5/2024     1:00 PM 4/22/2024     9:54 AM 5/20/2024    11:40 AM   Vitals   Systolic 136 135 126 135 165     Diastolic 82 76 80 88 89     Heart Rate 62 68 59 56 57     Temp 36 °C (96.8 °F)  36 °C (96.8 °F) 36 °C (96.8 °F) 36.2 °C (97.2 °F)     Resp 16 16 12 16 16     Height (in)      1.725 m (5' 7.91\") 1.725 m (5' 7.91\")   Weight (lb)      194 185   BMI      29.58 kg/m2 28.2 kg/m2   BSA (m2)      2.05 m2 2 m2   Visit Report      Report Report      There is no height or weight on file to calculate BMI.    General: Well-appearing, no acute distress    Skin intact bilateral upper and lower extremities  No erythema  No warmth    Detailed examination of the left shoulder demonstrates:  Surgical incision(s) well healed  No erythema or warmth  No ecchymosis or soft tissue swelling  Biceps contour normal  Shoulder range of motion: forward flexion 160, ER 30, IR mid lumbar  Good resistance with Jobes, ER and belly press testing  Upper extremity motor grossly intact  C5-T1 sensation intact bilaterally  2+ radial pulses " bilaterally  Warm and well-perfused, brisk capillary refill    IMAGING:   No new imaging

## 2024-07-03 ENCOUNTER — TREATMENT (OUTPATIENT)
Dept: PHYSICAL THERAPY | Facility: CLINIC | Age: 59
End: 2024-07-03
Payer: COMMERCIAL

## 2024-07-03 DIAGNOSIS — M75.122 NONTRAUMATIC COMPLETE TEAR OF LEFT ROTATOR CUFF: ICD-10-CM

## 2024-07-03 PROCEDURE — 97110 THERAPEUTIC EXERCISES: CPT | Mod: GP

## 2024-07-03 PROCEDURE — 97140 MANUAL THERAPY 1/> REGIONS: CPT | Mod: GP

## 2024-07-03 NOTE — PROGRESS NOTES
Physical Therapy Treatment Note       Patient Name: Arian Forde  MRN: [id number]  Today's Date: 7/3/2024     Time Calculation  Start Time: 0830  Stop Time: 0915  Time Calculation (min): 45 min  PT Therapeutic Procedures Time Entry  Manual Therapy Time Entry: 15  Therapeutic Exercise Time Entry: 30    Visit: 18/MN  Referred by:  Garielle Lopresti, PA-C, (Dr. Horacio Mukherjee, surgeon)  Insurance:  [facility]   Certification Dates:  5-20-24 to 9-9-24    Current Problem: [ M75.122]    1. Nontraumatic complete tear of left rotator cuff  Follow Up In Physical Therapy            Surgery: s/p Lt shoulder arthroscopy, RTCR (subscapularis & supraspinatus), extensive intra-articular debridement & synovectomy, SAD, acromioplasty & open subpectoral biceps tenodesis on 4/5/24     Operative Findings: Full-thickness supraspinatus tear, full-thickness upper border subscapularis tear, degenerative SLAP tear and biceps tenosynovitis, subacromial impingement/bursitis     Subjective:  Pt stated his Lt shoulder has been sore but not painful.      Patient's Living Environment/PLOF: Pt lives with spouse and son, has daughter in [employer].  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, RN    Patient's Therapy Goals: to return to baseline/PLOF for home & previous activities, yardwork, home maintenance w/o limitation     Pain:  Intensity: 1-2/10            Location: Left Shoulder, diffuse, more soreness than pain            Duration: Intermittent            Aggravating Factor: movement, position            Alleviating Factor:  OTC meds    HPI:  Pt had Lt RTCT for awhile (degenerative) & previous Lt distal biceps repair, found to have large spur  Social Factors:  1-2 personal factors &/or comorbidities     Precautions:  Per Protocol, RTW:  Tentative 8-5-24 (Pt reports there is no light duty at work)    Protocol:  (Large RTCR) Can begin shoulder isometrics at 12 Wks, shoulder resistance exercises at Wk 16     Objective      AAROM:  Lt shld Flex 170/PROM supine 158 std, Abd 155 supine, 144 in std, ER @ 90, 78, IR @ 90 85/90PROM, IR to L1, Horizontal Add 45, shld extension 64     Exercises:      Pulleys, 4 min, Flex, Scaption, 2 min each  Table Slides Flex, ER, Scaption, 10 x 5 sec each  Std Flex 2 x10, 5 sec, 1 lb  Wall Slides, 15 x , Flex, Circles, CW, CCW each  Std Scaption 10 x 5 sec, 1 lb  ER @ Neutral 10 x 5 sec, blue  Doorway Stretch.  3 x 15 sec hold  Prone Scapular Retraction, 5 x 3 sec  Prone Flex & Scaption  ER/IR, blue, 3 sec hold, 2 x 10 sec  Rows, 2 x 10, blue  Shoulder Ext, 2 x 10, blue  Cone Reach, 10 cones, 3 x , higher level (coat rack + 8 inch extra)  Serratus Stretch on bolster, 10 x     Manual:  PROM into shld flexion, and ER at neutral and 45 degrees abd, pt supine, light distraction,  upper trap stretching, scapular mobs in right sidelying, PA mobs C6-T5    Assessment:     Pt continues to tolerate phase II exercises well.  He sees the surgeon this afternoon.  He is tolerating all activities well at 12+ wks post op.  ROM WNL's, land approaching terminal ranges.   Manual performed as noted above and per protocol guidelines.  Decreased symptoms post treatment.        Plan: Continue per POC, working on progression towards FROM until cleared til next phase.

## 2024-07-05 ENCOUNTER — APPOINTMENT (OUTPATIENT)
Dept: PHYSICAL THERAPY | Facility: CLINIC | Age: 59
End: 2024-07-05
Payer: COMMERCIAL

## 2024-07-08 ENCOUNTER — TREATMENT (OUTPATIENT)
Dept: PHYSICAL THERAPY | Facility: CLINIC | Age: 59
End: 2024-07-08
Payer: COMMERCIAL

## 2024-07-08 DIAGNOSIS — M75.122 NONTRAUMATIC COMPLETE TEAR OF ROTATOR CUFF, LEFT: Primary | ICD-10-CM

## 2024-07-08 PROCEDURE — 97110 THERAPEUTIC EXERCISES: CPT | Mod: GP

## 2024-07-08 PROCEDURE — 97112 NEUROMUSCULAR REEDUCATION: CPT | Mod: GP

## 2024-07-08 PROCEDURE — 97140 MANUAL THERAPY 1/> REGIONS: CPT | Mod: GP

## 2024-07-08 NOTE — PROGRESS NOTES
Physical Therapy Treatment Note       Patient Name: Arian Forde  MRN: [id number]  Today's Date: 7/8/2024     Time Calculation  Start Time: 0700  Stop Time: 0745  Time Calculation (min): 45 min  PT Therapeutic Procedures Time Entry  Manual Therapy Time Entry: 13  Neuromuscular Re-Education Time Entry: 12  Therapeutic Exercise Time Entry: 20    Visit: 19/MN  Referred by:  Garielle Lopresti, PA-C, (Dr. Horacio Mukherjee, surgeon)  [facility]   Certification Dates:  5-20-24 to 9-9-24    Current Problem: [ M75.122]    1. Nontraumatic complete tear of rotator cuff, left              Surgery: s/p Lt shoulder arthroscopy, RTCR (subscapularis & supraspinatus), extensive intra-articular debridement & synovectomy, SAD, acromioplasty & open subpectoral biceps tenodesis on 4/5/24     Operative Findings: Full-thickness supraspinatus tear, full-thickness upper border subscapularis tear, degenerative SLAP tear and biceps tenosynovitis, subacromial impingement/bursitis     Subjective:  Pt stated his Lt shoulder has been sore but not painful.      Patient's Living Environment/PLOF: Pt lives with spouse and son, has daughter in [employer].  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, RN    Patient's Therapy Goals: to return to baseline/PLOF for home & previous activities, yardwork, home maintenance w/o limitation     Pain:  Intensity: 1-2/10            Location: Left Shoulder, diffuse, more soreness than pain            Duration: Intermittent            Aggravating Factor: movement, position            Alleviating Factor:  OTC meds    HPI:  Pt had Lt RTCT for awhile (degenerative) & previous Lt distal biceps repair, found to have large spur  Social Factors:  1-2 personal factors &/or comorbidities     Precautions:  Per Protocol, RTW:  Tentative 8-5-24 (Pt reports there is no light duty at work)    Protocol:  (Large RTCR) Can begin shoulder isometrics at 12 Wks, shoulder resistance exercises at Wk 16      Objective     AAROM:  Lt shld Flex 170/PROM supine 158 std, Abd 155 supine, 144 in std, ER @ 90, 78, IR @ 90 85/90PROM, IR to L1, Horizontal Add 45, shld extension 64     Exercises:      Pulleys, 4 min, Flex, Scaption, 2 min each  Table Slides Flex, ER, Scaption, 10 x 5 sec each  Std Flex 2 x10, 5 sec, 1 lb  Std Scaption 10 x 5 sec, 1 lb  ER @ Neutral 10 x 5 sec, blue  Doorway Stretch.  3 x 15 sec hold  ER/IR, blue, 3 sec hold, 2 x 10 sec  Rows, 2 x 10, blue  Shoulder Ext, 2 x 10, blue  Cone Reach, 10 cones, 3 x , higher level (coat rack + 8 inch extra)  Serratus Stretch on bolster, 10 x     NMR:    Prone Scapular Retraction, 5 x 3 sec  Prone Flex & Scaption, 2 x 15, 1 lb  Wall Slides, 15 x , Flex, Circles, CW, CCW each, 2 lbs  UBE, Forward, Bkwd, 2 lbs, 2 min each    Manual:  PROM into shld flexion, and ER at neutral and 45 degrees abd, pt supine, light distraction,  upper trap stretching, scapular mobs in right sidelying, PA mobs C6-T5    Assessment:     Pt continues to tolerate phase II exercises well.  He is tolerating increased PRE well.  ROM WNL's, land approaching terminal ranges.   Manual performed as noted above and per protocol guidelines.  Decreased symptoms post treatment.        Plan: Continue per POC, working on progression towards FROM until cleared til next phase.

## 2024-07-10 ENCOUNTER — TREATMENT (OUTPATIENT)
Dept: PHYSICAL THERAPY | Facility: CLINIC | Age: 59
End: 2024-07-10
Payer: COMMERCIAL

## 2024-07-10 DIAGNOSIS — M75.122 NONTRAUMATIC COMPLETE TEAR OF LEFT ROTATOR CUFF: Primary | ICD-10-CM

## 2024-07-10 PROCEDURE — 97140 MANUAL THERAPY 1/> REGIONS: CPT | Mod: GP

## 2024-07-10 PROCEDURE — 97110 THERAPEUTIC EXERCISES: CPT | Mod: GP

## 2024-07-12 ENCOUNTER — APPOINTMENT (OUTPATIENT)
Dept: PHYSICAL THERAPY | Facility: CLINIC | Age: 59
End: 2024-07-12
Payer: COMMERCIAL

## 2024-07-16 ENCOUNTER — TREATMENT (OUTPATIENT)
Dept: PHYSICAL THERAPY | Facility: CLINIC | Age: 59
End: 2024-07-16
Payer: COMMERCIAL

## 2024-07-16 DIAGNOSIS — M75.122 NONTRAUMATIC COMPLETE TEAR OF LEFT ROTATOR CUFF: Primary | ICD-10-CM

## 2024-07-16 PROCEDURE — 97110 THERAPEUTIC EXERCISES: CPT | Mod: GP

## 2024-07-16 PROCEDURE — 97140 MANUAL THERAPY 1/> REGIONS: CPT | Mod: GP

## 2024-07-16 NOTE — PROGRESS NOTES
Physical Therapy Treatment Note       Patient Name: Arian Forde  MRN: [id number]  Today's Date: 7/16/2024     Time Calculation  Start Time: 1135  Stop Time: 1235  Time Calculation (min): 60 min  PT Therapeutic Procedures Time Entry  Manual Therapy Time Entry: 20  Therapeutic Exercise Time Entry: 40  Visit: 21/MN  Referred by:  Garielle Lopresti, PA-C, (Dr. Horacio Mukherjee, surgeon)  Insurance:  [facility]   Certification Dates:  5-20-24 to 9-9-24    Current Problem: [ M75.122]    1. Nontraumatic complete tear of left rotator cuff              Surgery: s/p Lt shoulder arthroscopy, RTCR (subscapularis & supraspinatus), extensive intra-articular debridement & synovectomy, SAD, acromioplasty & open subpectoral biceps tenodesis on 4/5/24     Operative Findings: Full-thickness supraspinatus tear, full-thickness upper border subscapularis tear, degenerative SLAP tear and biceps tenosynovitis, subacromial impingement/bursitis     Subjective:  Pt stated his Lt shoulder has been sore but not painful.      Patient's Living Environment/PLOF: Pt lives with spouse and son, has daughter in [employer].  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, RN    Patient's Therapy Goals: to return to baseline/PLOF for home & previous activities, yardwork, home maintenance w/o limitation     Pain:  Intensity: 0/10            Location: Left Shoulder, diffuse, more soreness than pain            Duration: Intermittent            Aggravating Factor: movement, position            Alleviating Factor:  OTC meds    HPI:  Pt had Lt RTCT for awhile (degenerative) & previous Lt distal biceps repair, found to have large spur  Social Factors:  1-2 personal factors &/or comorbidities     Precautions:  Per Protocol, RTW:  Tentative 8-5-24 (Pt reports there is no light duty at work)    Protocol:  (Large RTCR) Can begin shoulder isometrics at 12 Wks, shoulder resistance exercises at Wk 16     Objective     HEP Compliance:   100%  Exercises:      Pulleys, 4 min, Flex, Scaption, 2 min each  Std Flex 2 x10, 5 sec, 2 lb  Std Scaption 10 x 5 sec, 2 lb  ER @ Neutral 2 x 15, 3 sec, 20 lbs  Doorway Stretch.  3 x 15 sec hold  IR, 30 lbs, 3 sec hold, 2 x 15 sec  Rows, 2 x 15, 30 lbs  Shoulder Ext, 2 x 15, 2 lbs  Cone Reach, 10 cones, 3 x , higher level (coat rack + 8 inch extra), 2 lbs  Serratus Stretch on bolster, 10 x   ER stretch @ 90 abd, 2 lbs, 10 x 10 sec  Horizontal Abd/Add, 2 x 15, 20 lbs  High to Low Diagonal, 30 lbs, 2 x 15   Low to High Diagonal, 20 lbs, 2 x 15   Wall Push Ups, 2 x 10  UBE, Forward, Bkwd, 2 lbs, 2 min each    Manual:  PROM into shld flexion, and ER at neutral and 45 degrees abd, pt supine, light distraction,  upper trap stretching, scapular mobs in right sidelying, PA mobs C6-T5    Assessment:     Pt leaves for vacation this Friday.  He will be gone for a week & resume PT when he returns.  He continues to tolerate all activities well.  ROM WNL's, and approaching FROM passively.   Manual performed as noted above and per protocol guidelines.  Decreased symptoms post treatment.        Plan: Continue per POC, working on progression towards FROM until cleared til next phase.

## 2024-07-18 ENCOUNTER — TREATMENT (OUTPATIENT)
Dept: PHYSICAL THERAPY | Facility: CLINIC | Age: 59
End: 2024-07-18
Payer: COMMERCIAL

## 2024-07-18 DIAGNOSIS — M75.122 NONTRAUMATIC COMPLETE TEAR OF LEFT ROTATOR CUFF: Primary | ICD-10-CM

## 2024-07-18 PROCEDURE — 97110 THERAPEUTIC EXERCISES: CPT | Mod: GP

## 2024-07-18 PROCEDURE — 97140 MANUAL THERAPY 1/> REGIONS: CPT | Mod: GP

## 2024-07-18 NOTE — PROGRESS NOTES
Physical Therapy Treatment Note       Patient Name: Arian Forde  MRN: [id number]  Today's Date: 7/18/2024     Time Calculation  Start Time: 1045  Stop Time: 1130  Time Calculation (min): 45 min  PT Therapeutic Procedures Time Entry  Manual Therapy Time Entry: 15  Therapeutic Exercise Time Entry: 30    Visit: 22/MN  Referred by:  Garielle Lopresti, PA-C, (Dr. Horacio Mukherjee, surgeon)  Insurance:  [facility]   Certification Dates:  5-20-24 to 9-9-24    Current Problem: [ M75.122]    1. Nontraumatic complete tear of left rotator cuff              Surgery: s/p Lt shoulder arthroscopy, RTCR (subscapularis & supraspinatus), extensive intra-articular debridement & synovectomy, SAD, acromioplasty & open subpectoral biceps tenodesis on 4/5/24     Operative Findings: Full-thickness supraspinatus tear, full-thickness upper border subscapularis tear, degenerative SLAP tear and biceps tenosynovitis, subacromial impingement/bursitis     Subjective:  Pt stated his Lt shoulder has been sore but not painful.      Patient's Living Environment/PLOF: Pt lives with spouse and son, has daughter in [employer].  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, RN    Patient's Therapy Goals: to return to baseline/PLOF for home & previous activities, yardwork, home maintenance w/o limitation     Pain:  Intensity: 0/10            Location: Left Shoulder, diffuse, more soreness than pain            Duration: Intermittent            Aggravating Factor: movement, position            Alleviating Factor:  OTC meds    HPI:  Pt had Lt RTCT for awhile (degenerative) & previous Lt distal biceps repair, found to have large spur  Social Factors:  1-2 personal factors &/or comorbidities     Precautions:  Per Protocol, RTW:  Tentative 8-5-24 (Pt reports there is no light duty at work)    Protocol:  (Large RTCR) Can begin shoulder isometrics at 12 Wks, shoulder resistance exercises at Wk 16     Objective     HEP Compliance:   100%    Exercises:      Pulleys, 4 min, Flex, Scaption, 2 min each  Std Flex 2 x10, 5 sec, 2 lb  Std Scaption 10 x 5 sec, 2 lb  ER @ Neutral 2 x 15, 3 sec, 20 lbs  Doorway Stretch.  3 x 15 sec hold  IR, 30 lbs, 3 sec hold, 2 x 15 sec  Rows, 2 x 15, 30 lbs  Serratus Stretch on bolster, 10 x   ER stretch @ 90 abd, 2 lbs, 10 x 10 sec  Horizontal Abd/Add, 2 x 15, 20 lbs  High to Low Diagonal, 30 lbs, 2 x 15   Low to High Diagonal, 20 lbs, 2 x 15   Wall Push Ups, 2 x 10  UBE, Forward, Bkwd, 2 lbs, 2 min each    Manual:  PROM into shld flexion, and ER at 90 degrees abd, pt supine, light distraction,  upper trap stretching, scapular mobs in right sidelying, PA mobs C6-T5, prone ER @ 90 abd    Assessment:     Pt leaves for vacation tomorrow, will not be back until after July 27.  He will resume PT when he returns.  He continues to tolerate all activities well.  ROM WNL's, and approaching FROM passively.   He reports being able to do things w/o any twinges of pain now.  Manual performed as noted above and per protocol guidelines.  Decreased symptoms post treatment.        Plan: Continue per POC, working on progression towards FROM until cleared til next phase.

## 2024-07-29 ENCOUNTER — TREATMENT (OUTPATIENT)
Dept: PHYSICAL THERAPY | Facility: CLINIC | Age: 59
End: 2024-07-29
Payer: COMMERCIAL

## 2024-07-29 DIAGNOSIS — M75.122 NONTRAUMATIC COMPLETE TEAR OF LEFT ROTATOR CUFF: Primary | ICD-10-CM

## 2024-07-29 PROCEDURE — 97140 MANUAL THERAPY 1/> REGIONS: CPT | Mod: GP

## 2024-07-29 PROCEDURE — 97110 THERAPEUTIC EXERCISES: CPT | Mod: GP

## 2024-07-29 NOTE — PROGRESS NOTES
Physical Therapy Treatment Note       Patient Name: Arian Forde  MRN: [id number]  Today's Date: 7/29/2024     Time Calculation  Start Time: 0700  Stop Time: 0745  Time Calculation (min): 45 min  PT Therapeutic Procedures Time Entry  Manual Therapy Time Entry: 12  Therapeutic Exercise Time Entry: 33    Visit: 23/MN  Referred by:  Garielle Lopresti, PA-C, (Dr. Horacio Mukherjee, surgeon)  Insurance:  [facility]   Certification Dates:  5-20-24 to 9-9-24    Current Problem: [ M75.122]    1. Nontraumatic complete tear of left rotator cuff              Surgery: s/p Lt shoulder arthroscopy, RTCR (subscapularis & supraspinatus), extensive intra-articular debridement & synovectomy, SAD, acromioplasty & open subpectoral biceps tenodesis on 4/5/24     Operative Findings: Full-thickness supraspinatus tear, full-thickness upper border subscapularis tear, degenerative SLAP tear and biceps tenosynovitis, subacromial impingement/bursitis     Subjective:  Pt back from vacation, stated he did not do much of his HEP on vacation.  Lt shoulder has not been sore, doing well.       Patient's Living Environment/PLOF: Pt lives with spouse and son, has daughter in [employer].  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, RN    Patient's Therapy Goals: to return to baseline/PLOF for home & previous activities, yardwork, home maintenance w/o limitation     Pain:  Intensity: 0/10            Location: Left Shoulder, diffuse, more soreness than pain            Duration: Intermittent            Aggravating Factor: movement, position            Alleviating Factor:  OTC meds    HPI:  Pt had Lt RTCT for awhile (degenerative) & previous Lt distal biceps repair, found to have large spur  Social Factors:  1-2 personal factors &/or comorbidities     Precautions:  Per Protocol, RTW:  Tentative 8-5-24 (Pt reports there is no light duty at work)    Protocol:  (Large RTCR) Can begin shoulder isometrics at 12 Wks, shoulder resistance  exercises at Wk 16     Objective     HEP Compliance:  100%    Exercises:      Pulleys, 4 min, Flex, Scaption, 2 min each  Std Flex 2 x10, 5 sec, 3 lb  Std Scaption 10 x 5 sec, 3 lb  ER @ Neutral 2 x 15, 3 sec, 20 lbs  Doorway Stretch.  3 x 15 sec hold  IR, 30 lbs, 3 sec hold, 2 x 15 sec  Rows, 2 x 15, 30 lbs  Serratus Stretch on bolster, 10 x   ER stretch @ 90 abd, 2 lbs, 10 x 10 sec  Horizontal Abd/Add, 2 x 15, 20 lbs  High to Low Diagonal, 30 lbs, 2 x 15   Low to High Diagonal, 20 lbs, 2 x 15   Wall Push Ups, 2 x 10  UBE, Forward, Bkwd, 2 lbs, 2 min each    Manual:  PROM into shld flexion, and ER at 90 degrees abd, pt supine, light distraction,  upper trap stretching, scapular mobs in right sidelying, PA mobs C6-T5, prone ER @ 90 abd    Assessment:     Pt continues to tolerate all activities well, stating he's getting stronger.  ROM WNL's, and approaching FROM.   He reports being able to do things w/o any functional limitations now.  Manual performed as noted above and per protocol guidelines.  Decreased symptoms post treatment.        Plan: Continue per POC, working on progression towards FROM until cleared til next phase.

## 2024-07-31 ENCOUNTER — TREATMENT (OUTPATIENT)
Dept: PHYSICAL THERAPY | Facility: CLINIC | Age: 59
End: 2024-07-31
Payer: COMMERCIAL

## 2024-07-31 DIAGNOSIS — M47.812 CERVICAL SPONDYLOSIS WITHOUT MYELOPATHY: ICD-10-CM

## 2024-07-31 DIAGNOSIS — M50.321 DEGENERATION OF INTERVERTEBRAL DISC AT C4-C5 LEVEL: ICD-10-CM

## 2024-07-31 DIAGNOSIS — M75.122 NONTRAUMATIC COMPLETE TEAR OF LEFT ROTATOR CUFF: Primary | ICD-10-CM

## 2024-07-31 PROCEDURE — 97140 MANUAL THERAPY 1/> REGIONS: CPT | Mod: GP

## 2024-07-31 PROCEDURE — 97110 THERAPEUTIC EXERCISES: CPT | Mod: GP

## 2024-07-31 NOTE — PROGRESS NOTES
Physical Therapy Treatment Note     Patient Name: Arian Forde  MRN: [id number]  Today's Date: 7/31/2024     Time Calculation  Start Time: 0830  Stop Time: 0915  Time Calculation (min): 45 min  PT Therapeutic Procedures Time Entry  Manual Therapy Time Entry: 15  Therapeutic Exercise Time Entry: 30    Visit: 24/MN  Referred by:  Garielle Lopresti, PA-C, (Dr. Horacio Mukherjee, surgeon)  Insurance:  [facility]   Certification Dates:  5-20-24 to 9-9-24    Current Problem: [ M75.122]    1. Nontraumatic complete tear of left rotator cuff              Surgery: s/p Lt shoulder arthroscopy, RTCR (subscapularis & supraspinatus), extensive intra-articular debridement & synovectomy, SAD, acromioplasty & open subpectoral biceps tenodesis on 4/5/24     Operative Findings: Full-thickness supraspinatus tear, full-thickness upper border subscapularis tear, degenerative SLAP tear and biceps tenosynovitis, subacromial impingement/bursitis     Subjective:  No issues reported this morning.         Patient's Living Environment/PLOF: Pt lives with spouse and son, has daughter in [employer].  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, RN    Patient's Therapy Goals: to return to baseline/PLOF for home & previous activities, yardwork, home maintenance w/o limitation     Pain:  Intensity: 0/10            Location: Left Shoulder, diffuse, more soreness than pain            Duration: Intermittent            Aggravating Factor: movement, position            Alleviating Factor:  OTC meds    HPI:  Pt had Lt RTCT for awhile (degenerative) & previous Lt distal biceps repair, found to have large spur  Social Factors:  1-2 personal factors &/or comorbidities     Precautions:  Per Protocol, RTW:  Tentative 8-5-24 (Pt reports there is no light duty at work)    Protocol:  (Large RTCR) Can begin shoulder isometrics at 12 Wks, shoulder resistance exercises at Wk 16     Objective     HEP Compliance:  100%    Exercises:      Pulleys, 4  min, Flex, Scaption, 2 min each  Std Flex 2 x10, 5 sec, 3 lb  Std Scaption 10 x 5 sec, 3 lb  ER @ Neutral 2 x 15, 3 sec, 20 lbs  Doorway Stretch.  3 x 15 sec hold  IR, 30 lbs, 3 sec hold, 2 x 15 sec  Rows, 2 x 15, 30 lbs  Serratus Stretch on bolster, 10 x   ER stretch @ 90 abd, 2 lbs, 10 x 10 sec  Horizontal Abd/Add, 2 x 15, 20 lbs  High to Low Diagonal, 30 lbs, 2 x 15   Low to High Diagonal, 20 lbs, 2 x 15   Wall Push Ups, 2 x 10  UBE, Forward, Bkwd, 2 lbs, 2 min each  Body Blade, 2 positions at neutral, 2 at shld level, 2 x 30 sec each    Manual:  PROM into shld flexion, and ER at 90 degrees abd, pt supine, light distraction,  upper trap stretching, scapular mobs in right sidelying, PA mobs C6-T5, prone ER @ 90 abd    Assessment:     Pt continues to tolerate all activities well, stating he's getting stronger.  He returns to work this upcoming Monday.  Manual performed as noted above and per protocol guidelines.  Decreased symptoms post treatment.        Plan: Continue per POC, working on progression towards FROM until cleared til next phase.

## 2024-08-02 ENCOUNTER — APPOINTMENT (OUTPATIENT)
Dept: PHYSICAL THERAPY | Facility: CLINIC | Age: 59
End: 2024-08-02
Payer: COMMERCIAL

## 2024-08-06 ENCOUNTER — TREATMENT (OUTPATIENT)
Dept: PHYSICAL THERAPY | Facility: CLINIC | Age: 59
End: 2024-08-06
Payer: COMMERCIAL

## 2024-08-06 DIAGNOSIS — M50.321 DEGENERATION OF INTERVERTEBRAL DISC AT C4-C5 LEVEL: ICD-10-CM

## 2024-08-06 DIAGNOSIS — M47.812 CERVICAL SPONDYLOSIS WITHOUT MYELOPATHY: ICD-10-CM

## 2024-08-06 PROCEDURE — 97140 MANUAL THERAPY 1/> REGIONS: CPT | Mod: GP

## 2024-08-06 PROCEDURE — 97110 THERAPEUTIC EXERCISES: CPT | Mod: GP

## 2024-08-06 NOTE — PROGRESS NOTES
Physical Therapy Treatment Note     Patient Name: Arian Forde  MRN: [id number]  Today's Date: 8-6-24     Time Calculation  Start Time: 1745  Stop Time: 1845  Time Calculation (min): 60 min  PT Therapeutic Procedures Time Entry  Manual Therapy Time Entry: 20  Therapeutic Exercise Time Entry: 40    Visit: 25/MN  Referred by:  Garielle Lopresti, PA-C, (Dr. Horacio Mukherjee, surgeon)  Insurance:  [facility]   Certification Dates:  5-20-24 to 9-9-24    Current Problem: [ M75.122]    1. Nontraumatic complete tear of left rotator cuff        2. Cervical spondylosis without myelopathy  Follow Up In Physical Therapy      3. Degeneration of intervertebral disc at C4-C5 level  Follow Up In Physical Therapy            Surgery: s/p Lt shoulder arthroscopy, RTCR (subscapularis & supraspinatus), extensive intra-articular debridement & synovectomy, SAD, acromioplasty & open subpectoral biceps tenodesis on 4/5/24     Operative Findings: Full-thickness supraspinatus tear, full-thickness upper border subscapularis tear, degenerative SLAP tear and biceps tenosynovitis, subacromial impingement/bursitis     Subjective:  Pt RTW yesterday.  He reports some increased stiffness due to prolonged postures.       Patient's Living Environment/PLOF: Pt lives with spouse and son, has daughter in [employer].  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, RN    Patient's Therapy Goals: to return to baseline/PLOF for home & previous activities, yardwork, home maintenance w/o limitation     Pain:  Intensity: 0/10            Location: Left Shoulder, diffuse, more soreness than pain            Duration: Intermittent            Aggravating Factor: movement, position            Alleviating Factor:  OTC meds    HPI:  Pt had Lt RTCT for awhile (degenerative) & previous Lt distal biceps repair, found to have large spur  Social Factors:  1-2 personal factors &/or comorbidities     Precautions:  Per Protocol, RTW:  Tentative 8-5-24 (Pt  "reports there is no light duty at work)    Protocol:  (Large RTCR) Can begin shoulder isometrics at 12 Wks, shoulder resistance exercises at Wk 16     Objective     AAROM:  Lt shld Flex 176/PROM 162 std, Abd 165/180 PROM std,  ER @ 90, 80, IR @ 90 85/90 PROM, IR to L3, Horizontal Add 47/60, shld extension 70    HEP Compliance:  100%    Exercises:      Std Flex 2 x10, 5 sec, 3 lb  Std Scaption 10 x 5 sec, 3 lb  IR, 30 lbs, 3 sec hold, 2 x 15 sec  Rows, 2 x 15, 30 lbs  ER stretch @ 90 abd, 2 lbs, 10 x 10 sec  Horizontal Abd/Add, 2 x 15, 20 lbs  High to Low Diagonal, 30 lbs, 2 x 15   Low to High Diagonal, 20 lbs, 2 x 15   Wall Push Ups, 2 x 10  Rotational planks, 2 x 10 each  Walk up on 3 inch step, 2 x 30 sec each  UBE, Forward, Bkwd, 2.5 lbs, 2 min each  Body Blade, 2 positions at neutral, 2 at shld level, 2 x 30 sec each    Manual:  PROM into shld flexion, and ER at 90 degrees abd, pt supine, light distraction,  upper trap stretching, scapular mobs in right sidelying, PA mobs C6-T5, prone ER @ 90 abd, cervical distraction, grade V manip to Lt, rot/SB/PA/side glide w/ cavitation, - on Rt, PROM all cervical planes.     Assessment:     Pt RTW yesterday.  He said it was a little rough just getting back into the \"swing of things\".  Lt shoulder was fine. He  continues to tolerate all activities well, stronger each week.  Manual performed as noted above.  Some tightness noted still at terminal ranges w/o pain.  Decreased symptoms post treatment.        Plan: Continue per POC, working on progression towards FROM until cleared til next phase.                                                                                                                                                                    "

## 2024-08-08 ENCOUNTER — TREATMENT (OUTPATIENT)
Dept: PHYSICAL THERAPY | Facility: CLINIC | Age: 59
End: 2024-08-08
Payer: COMMERCIAL

## 2024-08-08 DIAGNOSIS — M50.321 DEGENERATION OF INTERVERTEBRAL DISC AT C4-C5 LEVEL: ICD-10-CM

## 2024-08-08 DIAGNOSIS — M47.812 CERVICAL SPONDYLOSIS WITHOUT MYELOPATHY: ICD-10-CM

## 2024-08-08 PROCEDURE — 97140 MANUAL THERAPY 1/> REGIONS: CPT | Mod: GP

## 2024-08-08 PROCEDURE — 97110 THERAPEUTIC EXERCISES: CPT | Mod: GP

## 2024-08-08 NOTE — PROGRESS NOTES
Physical Therapy Treatment Note     Patient Name: Arian Forde  MRN: [id number]  Today's Date:  8-8-24     Time Calculation  Start Time: 1815  Stop Time: 1900  Time Calculation (min): 45 min  PT Therapeutic Procedures Time Entry  Manual Therapy Time Entry: 15  Therapeutic Exercise Time Entry: 30    Visit: 26/MN  Referred by:  Garielle Lopresti, PA-C, (Dr. Horacio Mukherjee, surgeon)  Insurance:  [facility]   Certification Dates:  5-20-24 to 9-9-24    Current Problem: [ M75.122]    1. Nontraumatic complete tear of left rotator cuff        2. Cervical spondylosis without myelopathy  Follow Up In Physical Therapy      3. Degeneration of intervertebral disc at C4-C5 level  Follow Up In Physical Therapy            Surgery: s/p Lt shoulder arthroscopy, RTCR (subscapularis & supraspinatus), extensive intra-articular debridement & synovectomy, SAD, acromioplasty & open subpectoral biceps tenodesis on 4/5/24     Operative Findings: Full-thickness supraspinatus tear, full-thickness upper border subscapularis tear, degenerative SLAP tear and biceps tenosynovitis, subacromial impingement/bursitis     Subjective:  Pt RTW this week.  He reported that he has been exhausted this week.         Patient's Living Environment/PLOF: Pt lives with spouse and son, has daughter in [employer].  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, RN    Patient's Therapy Goals: to return to baseline/PLOF for home & previous activities, yardwork, home maintenance w/o limitation     Pain:  Intensity: 0/10            Location: Left Shoulder, diffuse, more soreness than pain            Duration: Intermittent            Aggravating Factor: movement, position            Alleviating Factor:  OTC meds    HPI:  Pt had Lt RTCT for awhile (degenerative) & previous Lt distal biceps repair, found to have large spur  Social Factors:  1-2 personal factors &/or comorbidities     Precautions:  Per Protocol, RTW:  Tentative 8-5-24 (Pt reports there  is no light duty at work)    Protocol:  (Large RTCR) Can begin shoulder isometrics at 12 Wks, shoulder resistance exercises at Wk 16     Objective     AAROM:  Lt shld Flex 176/PROM 162 std, Abd 165/180 PROM std,  ER @ 90, 80, IR @ 90 85/90 PROM, IR to L3, Horizontal Add 47/60, shld extension 70    HEP Compliance:  100%    Exercises:      Std Flex 2 x10, 5 sec, 3 lb  Std Scaption 10 x 5 sec, 3 lb  IR, 30 lbs, 3 sec hold, 2 x 15 sec  Rows, 2 x 15, 30 lbs  ER stretch @ 90 abd, 2 lbs, 10 x 10 sec  Horizontal Abd/Add, 2 x 15, 20 lbs  High to Low Diagonal, 30 lbs, 2 x 15   Low to High Diagonal, 20 lbs, 2 x 15   Wall Push Ups, 2 x 10  Rotational planks, 2 x 10 each  Walk up on 3 inch step, 2 x 30 sec each  UBE, Forward, Bkwd, 2.5 lbs, 2 min each  Body Blade, 2 positions at neutral, 2 at shld level, 2 x 30 sec each  Prone posterior Delt raises on P-ball, 3 lbs, 2 x 10  Prone ER/Abd on P-ball, 3 lbs, 2 x 10    Manual:  PROM into shld flexion, and ER at 90 degrees abd, pt supine, light distraction,  upper trap stretching, scapular mobs in right sidelying, PA mobs C6-T5, prone ER @ 90 abd  Assessment:     Pt RTW yesterday.  Lt shoulder looser today, close to FROM all planes. He  continues to tolerate all activities well, stronger each week.  Manual performed as noted above.  Decreased symptoms post treatment.        Plan: Continue per POC, working on progression towards FROM until cleared til next phase.

## 2024-08-13 ENCOUNTER — TREATMENT (OUTPATIENT)
Dept: PHYSICAL THERAPY | Facility: CLINIC | Age: 59
End: 2024-08-13
Payer: COMMERCIAL

## 2024-08-13 DIAGNOSIS — M47.812 CERVICAL SPONDYLOSIS WITHOUT MYELOPATHY: ICD-10-CM

## 2024-08-13 DIAGNOSIS — M50.321 DEGENERATION OF INTERVERTEBRAL DISC AT C4-C5 LEVEL: ICD-10-CM

## 2024-08-13 PROCEDURE — 97110 THERAPEUTIC EXERCISES: CPT | Mod: GP

## 2024-08-13 PROCEDURE — 97140 MANUAL THERAPY 1/> REGIONS: CPT | Mod: GP

## 2024-08-13 PROCEDURE — 97530 THERAPEUTIC ACTIVITIES: CPT | Mod: GP

## 2024-08-13 NOTE — PROGRESS NOTES
Physical Therapy Treatment Note     Patient Name: Arian Forde  MRN: [id number]  Today's Date: 8-13-24     Time Calculation  Start Time: 1808  Stop Time: 1905  Time Calculation (min): 57 min  PT Therapeutic Procedures Time Entry  Manual Therapy Time Entry: 20  Therapeutic Exercise Time Entry: 20  Therapeutic Activity Time Entry: 17    Visit: 27/MN  Referred by:  Garielle Lopresti, PA-C, (Dr. Horacio Mukherjee, surgeon)  Insurance:  [facility]   Certification Dates:  5-20-24 to 9-9-24    Current Problem: [ M75.122]    1. Nontraumatic complete tear of left rotator cuff        2. Cervical spondylosis without myelopathy  Follow Up In Physical Therapy      3. Degeneration of intervertebral disc at C4-C5 level  Follow Up In Physical Therapy            Surgery: s/p Lt shoulder arthroscopy, RTCR (subscapularis & supraspinatus), extensive intra-articular debridement & synovectomy, SAD, acromioplasty & open subpectoral biceps tenodesis on 4/5/24     Operative Findings: Full-thickness supraspinatus tear, full-thickness upper border subscapularis tear, degenerative SLAP tear and biceps tenosynovitis, subacromial impingement/bursitis     Subjective:  He now reports that his Rt knee is bothering him.  He had his Lt knee drained.  Both knees have been achy.  Lt shoulder doing well.        Patient's Living Environment/PLOF: Pt lives with spouse and son, has daughter in [employer].  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, RN    Patient's Therapy Goals: to return to baseline/PLOF for home & previous activities, yardwork, home maintenance w/o limitation     Pain:  Intensity: 0/10            Location: Left Shoulder, diffuse, more soreness than pain            Duration: Intermittent            Aggravating Factor: movement, position, heavier loads            Alleviating Factor:  OTC meds, prn    HPI:  Pt had Lt RTCT for awhile (degenerative) & previous Lt distal biceps repair, found to have large spur  Social  Factors:  1-2 personal factors &/or comorbidities     Precautions:  Per Protocol, RTW:  Tentative 8-5-24 (Pt reports there is no light duty at work)    Protocol:  (Large RTCR) Can begin shoulder isometrics at 12 Wks, shoulder resistance exercises at Wk 16     Objective     AAROM:  Lt shld Flex 180/PROM 166 std, Abd 165/180 PROM std,  ER @ 90, 80, IR @ 90 85/90 PROM, IR to L3, Horizontal Add 47/60, shld extension 70    HEP Compliance:  100%    Exercises:      Std Flex 2 x10, 5 sec, 3 lb  Std Scaption 10 x 5 sec, 3 lb  IR, 30 lbs, 3 sec hold, 2 x 15 sec  Rows, 2 x 15, 40 lbs  ER stretch @ 90 abd, 2 lbs, 10 x 10 sec  Horizontal Abd/Add, 2 x 15, 20 lbs  Prone posterior Delt raises on P-ball, 3 lbs, 2 x 10  Prone ER/Abd on P-ball, 3 lbs, 2 x 10  High to Low Diagonal, 30 lbs, 2 x 15   Low to High Diagonal, 20 lbs, 2 x 15     Therapeutic Activity:    Walk Outs on P-Ball, 2 x 12  Rotational planks, 2 x 10 each  Walk up on 3 inch step, 2 x 30 sec each  UBE, Forward, Bkwd, 2.5 lbs, 2 min each  Body Blade, 2 positions at neutral, 2 at shld level, 2 x 30 sec each    Manual:  PROM into shld flexion, and ER at 90 degrees abd, pt supine, light distraction,  upper trap stretching, scapular mobs in right sidelying, PA mobs C6-T5, prone ER @ 90 abd    Assessment:     Pt reports no issues with the Lt shoulder & his lead vest is also not a problem now.  Lt shoulder mobility has improved considerably. He continues to tolerate all activities well, stronger each week.  Manual performed as noted above.  Decreased symptoms post treatment.        Plan: Continue per POC, working on progression towards FROM until cleared til next phase.

## 2024-08-15 ENCOUNTER — APPOINTMENT (OUTPATIENT)
Dept: PHYSICAL THERAPY | Facility: CLINIC | Age: 59
End: 2024-08-15
Payer: COMMERCIAL

## 2024-08-22 ENCOUNTER — TREATMENT (OUTPATIENT)
Dept: PHYSICAL THERAPY | Facility: CLINIC | Age: 59
End: 2024-08-22
Payer: COMMERCIAL

## 2024-08-22 DIAGNOSIS — M75.122 NONTRAUMATIC COMPLETE TEAR OF LEFT ROTATOR CUFF: ICD-10-CM

## 2024-08-22 PROCEDURE — 97140 MANUAL THERAPY 1/> REGIONS: CPT | Mod: GP

## 2024-08-22 PROCEDURE — 97530 THERAPEUTIC ACTIVITIES: CPT | Mod: GP

## 2024-08-22 PROCEDURE — 97110 THERAPEUTIC EXERCISES: CPT | Mod: GP

## 2024-08-22 NOTE — PROGRESS NOTES
Physical Therapy Treatment Note     Patient Name: Arian Forde  MRN: [id number]  Today's Date: 8/22/2024     Time Calculation  Start Time: 1610  Stop Time: 1715  Time Calculation (min): 65 min  PT Therapeutic Procedures Time Entry  Manual Therapy Time Entry: 20  Therapeutic Exercise Time Entry: 25  Therapeutic Activity Time Entry: 20    Visit: 28/MN  Referred by:  Garielle Lopresti, PA-C, (Dr. Horacio Mukherjee, surgeon)  [facility]   Certification Dates:  5-20-24 to 9-9-24    Current Problem: [ M75.122]    1. Nontraumatic complete tear of left rotator cuff  Follow Up In Physical Therapy            Surgery: s/p Lt shoulder arthroscopy, RTCR (subscapularis & supraspinatus), extensive intra-articular debridement & synovectomy, SAD, acromioplasty & open subpectoral biceps tenodesis on 4/5/24     Operative Findings: Full-thickness supraspinatus tear, full-thickness upper border subscapularis tear, degenerative SLAP tear and biceps tenosynovitis, subacromial impingement/bursitis     Subjective:  Pt stated he slept on the couch last night, fell asleep, never got up. Neck is stiff, Lt shoulder doing well.        Patient's Living Environment/PLOF: Pt lives with spouse and son, has daughter in [employer].  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, RN    Patient's Therapy Goals: to return to baseline/PLOF for home & previous activities, yardwork, home maintenance w/o limitation     Pain:  Intensity: 0/10            Location: Left Shoulder, diffuse, more soreness than pain            Duration: Intermittent, very infrequent            Aggravating Factor: movement, position, heavier loads            Alleviating Factor:  OTC meds, prn    HPI:  Pt had Lt RTCT for awhile (degenerative) & previous Lt distal biceps repair, found to have large spur  Social Factors:  1-2 personal factors &/or comorbidities     Precautions:  Per Protocol, RTW:  Tentative 8-5-24 (Pt reports there is no light duty at  work)    Protocol:  (Large RTCR) Can begin shoulder isometrics at 12 Wks, shoulder resistance exercises at Wk 16     Objective     HEP Compliance:  100%    Exercises:      Std Flex 2 x10, 5 sec, 3 lb  Std Scaption 10 x 5 sec, 3 lb  IR, 30 lbs, 3 sec hold, 2 x 15 sec  Rows, 2 x 15, 40 lbs  ER stretch @ 90 abd, 2 lbs, 10 x 10 sec  Horizontal Abd/Add, 2 x 15, 20 lbs  Prone posterior Delt raises on P-ball, 3 lbs, 2 x 10  Prone ER/Abd on P-ball, 3 lbs, 2 x 10  High to Low Diagonal, 30 lbs, 2 x 15   Low to High Diagonal, 20 lbs, 2 x 15     Therapeutic Activity:    Walk Outs on P-Ball with pushup, 2 x 12  Rotational planks, 2 x 10 each  Walk up on 3 inch step, 2 x 30 sec each  UBE, Forward, Bkwd, 2.5 lbs, 2 min each  Body Blade, 2 positions at neutral, 2 at shld level, 2 x 30 sec each    Manual:  PROM into shld flexion, and ER at 90 degrees abd, pt supine, light distraction,  upper trap stretching, scapular mobs in right sidelying, PA mobs C6-T5, prone ER @ 90 abd, cervical distraction, grade V manip (B) for improved rotation, + cavitations to Lt    Assessment:     Pt reports no issues with the Lt shoulder, just weakness.  Lt shoulder mobility has improved considerably. He continues to tolerate all activities well, stronger each week.  Manual performed as noted above.  Decreased symptoms post treatment.        Plan: Continue per POC, working on progression towards FROM until cleared til next phase.

## 2024-08-27 ENCOUNTER — HOSPITAL ENCOUNTER (OUTPATIENT)
Dept: RADIOLOGY | Facility: CLINIC | Age: 59
Discharge: HOME | End: 2024-08-27
Payer: COMMERCIAL

## 2024-08-27 ENCOUNTER — APPOINTMENT (OUTPATIENT)
Dept: ORTHOPEDIC SURGERY | Facility: CLINIC | Age: 59
End: 2024-08-27
Payer: COMMERCIAL

## 2024-08-27 ENCOUNTER — TREATMENT (OUTPATIENT)
Dept: PHYSICAL THERAPY | Facility: CLINIC | Age: 59
End: 2024-08-27
Payer: COMMERCIAL

## 2024-08-27 VITALS — HEIGHT: 68 IN | WEIGHT: 185 LBS | BODY MASS INDEX: 28.04 KG/M2

## 2024-08-27 DIAGNOSIS — M17.0 BILATERAL PRIMARY OSTEOARTHRITIS OF KNEE: Primary | ICD-10-CM

## 2024-08-27 DIAGNOSIS — M25.561 RIGHT KNEE PAIN, UNSPECIFIED CHRONICITY: ICD-10-CM

## 2024-08-27 DIAGNOSIS — S43.432A DEGENERATIVE SUPERIOR LABRAL ANTERIOR-TO-POSTERIOR (SLAP) TEAR OF LEFT SHOULDER: Primary | ICD-10-CM

## 2024-08-27 DIAGNOSIS — M75.122 NONTRAUMATIC COMPLETE TEAR OF LEFT ROTATOR CUFF: ICD-10-CM

## 2024-08-27 PROCEDURE — 99204 OFFICE O/P NEW MOD 45 MIN: CPT | Performed by: EMERGENCY MEDICINE

## 2024-08-27 PROCEDURE — 97140 MANUAL THERAPY 1/> REGIONS: CPT | Mod: GP

## 2024-08-27 PROCEDURE — 97110 THERAPEUTIC EXERCISES: CPT | Mod: GP

## 2024-08-27 PROCEDURE — 73564 X-RAY EXAM KNEE 4 OR MORE: CPT | Mod: 50

## 2024-08-27 PROCEDURE — 3008F BODY MASS INDEX DOCD: CPT | Performed by: EMERGENCY MEDICINE

## 2024-08-27 RX ORDER — CELECOXIB 200 MG/1
200 CAPSULE ORAL 2 TIMES DAILY
Qty: 60 CAPSULE | Refills: 0 | Status: SHIPPED | OUTPATIENT
Start: 2024-08-27 | End: 2024-09-26

## 2024-08-27 ASSESSMENT — PAIN - FUNCTIONAL ASSESSMENT: PAIN_FUNCTIONAL_ASSESSMENT: 0-10

## 2024-08-27 ASSESSMENT — PAIN SCALES - GENERAL: PAINLEVEL_OUTOF10: 2

## 2024-08-27 NOTE — PROGRESS NOTES
Subjective    Patient ID: Arian Forde is a 58 y.o. male.    Chief Complaint: Pain of the Right Knee (Progressive pain localized inferior for 3+ months when flexing the knee. Hard time sleeping due to pain. no Hx of injury, diabetes, Sx, or injections. No numbness/tingling. Aleve with mild help. /Xr today ) and Pain of the Left Knee (Progressive pain localized medially for 3+ months when flexing.)     Last Surgery: No surgery found  Last Surgery Date: No surgery found    Arian is a very pleasant 58-year-old male who is coming in with acute on chronic bilateral knee pain mostly over the medial joint compartments.  He was a wrestler back in high school but denies any recent traumatic event or known injuries.  He has increased his walking for exercise recently has noticed that his pain has been getting a little bit worse with activity.  He denies any mechanical symptoms.  The pain on the left knee is mostly over the medial compartment but the pain in the right knee radiates from the medial compartment throughout the entire anterior aspect of the right knee.  When he made the appointment he was having mainly right knee pain but states that since that time he is having similar symptoms in both sides.  He has taken Aleve and has even tried other NSAIDs like meloxicam in the past without much improvement.  He would like to hold off on injections for now.  No infectious symptoms.  No other complaints today.        Objective   Right Knee Exam     Muscle Strength   The patient has normal right knee strength.    Tenderness   The patient is experiencing tenderness in the medial joint line.    Range of Motion   The patient has normal right knee ROM.  Extension:  normal   Flexion:  normal     Tests   Iván:  Medial - negative Lateral - negative  Varus: negative Valgus: negative    Other   Erythema: absent  Sensation: normal  Pulse: present  Swelling: none  Effusion: no effusion present      Left Knee Exam     Muscle  Strength   The patient has normal left knee strength.    Tenderness   The patient is experiencing tenderness in the medial joint line.    Range of Motion   The patient has normal left knee ROM.  Extension:  normal   Flexion:  normal     Tests   Iván:  Medial - negative Lateral - negative  Varus: negative Valgus: negative    Other   Erythema: absent  Sensation: normal  Pulse: present  Swelling: none  Effusion: no effusion present    Comments:  Mild bilateral patellar crepitus throughout range of motion testing.            Image Results:  X-rays of both knees were reviewed and interpreted by me on 8/27/2024 and revealed mild degenerative changes especially in the medial compartments of both knees.  Some chondrocalcinosis in the medial compartment on the right.  No evidence of acute injuries or fractures.    Assessment/Plan   Encounter Diagnoses:  Bilateral primary osteoarthritis of knee    Right knee pain, unspecified chronicity    Orders Placed This Encounter    celecoxib (CeleBREX) 200 mg capsule     No follow-ups on file.      We discussed his treatment options at length and eventually decided to start him on Celebrex since he has not responded well to other NSAIDs.  I offered him knee injections but he wants to wait to see how he responds to the Celebrex and then will follow-up with me in about 3 to 4 weeks if needed for possible cortisone injections.  He is also considering doing PRP injections and will let me know in the future if he wants to proceed with those injections.    ** Please excuse any errors in grammar or translation related to this dictation. Voice recognition software was utilized to prepare this document. **       Rayo Crandall MD  [facility]

## 2024-08-27 NOTE — PROGRESS NOTES
Physical Therapy Treatment Note     Patient Name: Arian Forde  MRN: [id number]  Today's Date: 8/27/2024     Time Calculation  Start Time: 1818  Stop Time: 1910  Time Calculation (min): 52 min  PT Therapeutic Procedures Time Entry  Manual Therapy Time Entry: 15  Therapeutic Exercise Time Entry: 25  Therapeutic Activity Time Entry: 10  Visit: 29/MN  Referred by:  Garielle Lopresti, PA-C, (Dr. Horacio Mukherjee, surgeon)  [facility]   Certification Dates:  5-20-24 to 9-9-24    Current Problem: [ M75.122]    1. 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 Nontraumatic complete tear of left rotator cuff  Follow Up In Physical Therapy            Surgery: s/p Lt shoulder arthroscopy, RTCR (subscapularis & supraspinatus), extensive intra-articular debridement & synovectomy, SAD, acromioplasty & open subpectoral biceps tenodesis on 4/5/24     Operative Findings: Full-thickness supraspinatus tear, full-thickness upper border subscapularis tear, degenerative SLAP tear and biceps tenosynovitis, subacromial impingement/bursitis     Subjective:  Pt stated left shoulder is doing better than he expected, very pleased with progress to date.   Patient's Living Environment/PLOF: Pt lives with spouse and son, has daughter in [employer].  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, RN    Patient's Therapy Goals: to return to baseline/PLOF for home & previous activities, yardwork, home maintenance w/o limitation     Pain:  Intensity: 0/10            Location: Left Shoulder, diffuse, more soreness than pain            Duration: Intermittent, very infrequent            Aggravating Factor: movement, position, heavier loads            Alleviating Factor:  OTC meds, prn    HPI:  Pt had Lt RTCT for awhile (degenerative) & previous Lt distal biceps repair, found to have large spur  Social Factors:  1-2 personal factors &/or comorbidities     Precautions:  Per  Protocol, RTW:  Tentative 8-5-24 (Pt reports there is no light duty at work)    Protocol:  (Large RTCR) Can begin shoulder isometrics at 12 Wks, shoulder resistance exercises at Wk 16     Objective     HEP Compliance:  100%    Exercises:      Std Flex 2 x10, 5 sec, 3 lb  Std Scaption 10 x 5 sec, 3 lb  IR, 30 lbs, 3 sec hold, 2 x 15 sec  Rows, 2 x 15, 40 lbs  ER stretch @ 90 abd, 2 lbs, 10 x 10 sec  Horizontal Abd/Add, 2 x 15, 20 lbs  Prone posterior Delt raises on P-ball, 3 lbs, 2 x 10  Prone ER/Abd on P-ball, 3 lbs, 2 x 10  High to Low Diagonal, 30 lbs, 2 x 15   Low to High Diagonal, 20 lbs, 2 x 15     Therapeutic Activity:    Walk Outs on P-Ball with pushup, 2 x 12  Rotational planks, 2 x 10 each  Walk up on 3 inch step, 2 x 30 sec each  UBE, Forward, Bkwd, 2.5 lbs, 2 min each  Body Blade, 2 positions at neutral, 2 at shld level, 2 x 30 sec each    Manual:  PROM into shld flexion, and ER at 90 degrees abd, pt supine, light distraction,  upper trap stretching, scapular mobs in right sidelying, PA mobs C6-T5, prone ER @ 90 abd, cervical distraction, grade V manip (B) for improved rotation, + cavitations to Lt    Assessment:     Pt reports the Lt shoulder is getting stronger, he's able to do more with less restrictions.  He was trying to do something OH this week & noticed he fatigued easily on the left UE still.  Lt shoulder mobility continues to improve. He continues to tolerate all activities well, stronger each week.  Manual performed as noted above.  Decreased symptoms post treatment.        Plan: Continue per POC, working on progression towards FROM until cleared til next phase.

## 2024-08-29 ENCOUNTER — TREATMENT (OUTPATIENT)
Dept: PHYSICAL THERAPY | Facility: CLINIC | Age: 59
End: 2024-08-29
Payer: COMMERCIAL

## 2024-08-29 DIAGNOSIS — M75.122 NONTRAUMATIC COMPLETE TEAR OF LEFT ROTATOR CUFF: ICD-10-CM

## 2024-08-29 PROCEDURE — 97140 MANUAL THERAPY 1/> REGIONS: CPT | Mod: GP

## 2024-08-29 PROCEDURE — 97110 THERAPEUTIC EXERCISES: CPT | Mod: GP

## 2024-08-29 PROCEDURE — 97530 THERAPEUTIC ACTIVITIES: CPT | Mod: GP

## 2024-08-29 NOTE — PROGRESS NOTES
Physical Therapy Treatment Note     Patient Name: Arian Forde  MRN: [id number]  Today's Date: 8/29/2024     Time Calculation  Start Time: 1800  Stop Time: 1900  Time Calculation (min): 60 min  PT Therapeutic Procedures Time Entry  Manual Therapy Time Entry: 15  Therapeutic Exercise Time Entry: 30  Therapeutic Activity Time Entry: 15    Visit: 30/MN  Referred by:  Garielle Lopresti, PA-C, (Dr. Horacio Mukherjee, surgeon)  Insurance:  [facility]   Certification Dates:  5-20-24 to 9-9-24    Current Problem: [ M75.122]    1. Nontraumatic complete tear of left rotator cuff  Follow Up In Physical Therapy            Surgery: s/p Lt shoulder arthroscopy, RTCR (subscapularis & supraspinatus), extensive intra-articular debridement & synovectomy, SAD, acromioplasty & open subpectoral biceps tenodesis on 4/5/24     Operative Findings: Full-thickness supraspinatus tear, full-thickness upper border subscapularis tear, degenerative SLAP tear and biceps tenosynovitis, subacromial impingement/bursitis     Subjective:  Pt stated left shoulder is doing better than he expected, very pleased with progress to date.   Patient's Living Environment/PLOF: Pt lives with spouse and son, has daughter in [employer].  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, RN    Patient's Therapy Goals: to return to baseline/PLOF for home & previous activities, yardwork, home maintenance w/o limitation     Pain:  Intensity: 0/10            Location: Left Shoulder, diffuse, more soreness than pain            Duration: Intermittent, very infrequent            Aggravating Factor: movement, position, heavier loads            Alleviating Factor:  OTC meds, prn    HPI:  Pt had Lt RTCT for awhile (degenerative) & previous Lt distal biceps repair, found to have large spur  Social Factors:  1-2 personal factors &/or comorbidities     Precautions:  Per Protocol, RTW:  Tentative 8-5-24 (Pt reports there is no light duty at work)    Protocol:   (Large RTCR) Can begin shoulder isometrics at 12 Wks, shoulder resistance exercises at Wk 16     Objective     HEP Compliance:  100%    Exercises:      Std Flex 2 x10, 5 sec, 3 lb  Std Scaption 10 x 5 sec, 3 lb  IR, 30 lbs, 3 sec hold, 2 x 15 sec  Rows, 2 x 15, 40 lbs  ER stretch @ 90 abd, 2 lbs, 10 x 10 sec  Horizontal Abd/Add, 2 x 15, 20 lbs  Prone posterior Delt raises on P-ball, 3 lbs, 2 x 10  Prone ER/Abd on P-ball, 3 lbs, 2 x 10  High to Low Diagonal, with thumb rotation, 4 lbs, 2 x 15   Low to High Diagonal, with thumb rotation, 4 lbs, 2 x 15     Therapeutic Activity:    Walk Outs on P-Ball with pushup, orange, 2 x 12  Rotational planks, 2 x 10 each  Walk up on 3 inch step, 2 x 30 sec each  UBE, Forward, Bkwd, 2.5 lbs, 2 min each  Body Blade, 2 positions at neutral, 2 at shld level, 2 x 30 sec each    Manual:  PROM into shld flexion, and ER at 90 degrees abd, pt supine, light distraction,  upper trap stretching, scapular mobs in right sidelying, PA mobs C6-T5, prone ER @ 90 abd, cervical distraction, grade V manip (B) for improved rotation, + cavitations to Lt    Assessment:     Pt reports the Lt shoulder is getting stronger, he is still weak in OH positions.  Lt shoulder mobility continues to improve. He continues to tolerate all activities well, stronger each week.  Manual performed as noted above.  Added PNF patterns today as noted above.  IR strap stretch is at T7 w/o pain today.  Decreased symptoms post treatment.        Plan: Continue per POC, working on progression towards FROM until cleared til next phase.

## 2024-09-03 ENCOUNTER — TREATMENT (OUTPATIENT)
Dept: PHYSICAL THERAPY | Facility: CLINIC | Age: 59
End: 2024-09-03
Payer: COMMERCIAL

## 2024-09-03 DIAGNOSIS — M75.122 NONTRAUMATIC COMPLETE TEAR OF LEFT ROTATOR CUFF: Primary | ICD-10-CM

## 2024-09-03 PROCEDURE — 97140 MANUAL THERAPY 1/> REGIONS: CPT | Mod: GP

## 2024-09-03 PROCEDURE — 97110 THERAPEUTIC EXERCISES: CPT | Mod: GP

## 2024-09-03 PROCEDURE — 97530 THERAPEUTIC ACTIVITIES: CPT | Mod: GP

## 2024-09-03 NOTE — PROGRESS NOTES
Physical Therapy Treatment Note     Patient Name: Arian Forde  MRN: [id number]  Today's Date: 9/3/2024     Time Calculation  Start Time: 1445  Stop Time: 1530  Time Calculation (min): 45 min  PT Therapeutic Procedures Time Entry  Manual Therapy Time Entry: 10  Therapeutic Exercise Time Entry: 30  Therapeutic Activity Time Entry: 15    Visit: 31/MN  Referred by:  Garielle Lopresti, PA-C, (Dr. Horacio Mukherjee, surgeon)  Insurance:  [facility]   Certification Dates:  5-20-24 to 9-9-24    Current Problem: [ M75.122]    1. Nontraumatic complete tear of left rotator cuff              Surgery: s/p Lt shoulder arthroscopy, RTCR (subscapularis & supraspinatus), extensive intra-articular debridement & synovectomy, SAD, acromioplasty & open subpectoral biceps tenodesis on 4/5/24     Operative Findings: Full-thickness supraspinatus tear, full-thickness upper border subscapularis tear, degenerative SLAP tear and biceps tenosynovitis, subacromial impingement/bursitis     Subjective:  Pt stated left shoulder is doing better than he expected, very pleased with progress to date.   Patient's Living Environment/PLOF: Pt lives with spouse and son, has daughter in [employer].  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, RN    Patient's Therapy Goals: to return to baseline/PLOF for home & previous activities, yardwork, home maintenance w/o limitation     Pain:  Intensity: 0/10            Location: Left Shoulder, diffuse, more soreness than pain            Duration: Intermittent, very infrequent            Aggravating Factor: movement, position, heavier loads            Alleviating Factor:  OTC meds, prn    HPI:  Pt had Lt RTCT for awhile (degenerative) & previous Lt distal biceps repair, found to have large spur  Social Factors:  1-2 personal factors &/or comorbidities     Precautions:  Per Protocol, RTW:  Tentative 8-5-24 (Pt reports there is no light duty at work)    Objective     HEP Compliance:   100%    Exercises:      Std Flex 2 x10, 5 sec, 3 lb  Std Scaption 10 x 5 sec, 3 lb  IR, 30 lbs, 3 sec hold, 2 x 15 sec  Rows, 2 x 15, 40 lbs  ER stretch @ 90 abd, 2 lbs, 10 x 10 sec  Horizontal Abd/Add, 2 x 15, 20 lbs  Prone posterior Delt raises on P-ball, 3 lbs, 2 x 10  Prone ER/Abd on P-ball, 3 lbs, 2 x 10  High to Low Diagonal, with thumb rotation, 4 lbs, 2 x 15   Low to High Diagonal, with thumb rotation, 4 lbs, 2 x 15     Therapeutic Activity:    Walk Outs on P-Ball with pushup, orange, 2 x 12  Rotational planks, 2 x 10 each  Walk up on 3 inch step, 2 x 30 sec each  UBE, Forward, Bkwd, 2.5 lbs, 2 min each  Body Blade, 2 positions at neutral, 2 at shld level, 2 x 30 sec each    Manual:  PROM into shld flexion, and ER at 90 degrees abd, pt supine, light distraction,  upper trap stretching, scapular mobs in right sidelying, PA mobs C6-T5, prone ER @ 90 abd, cervical distraction, grade V manip (B) for improved rotation, + cavitations to Lt    Rt shld hold in full flex:  52+ sec, Rt shld 90 degrees flex:  60 sec  Lt shld hold in full flex:  22 sec, Lt shld 90 degrees flex:  28 sec    Assessment:     Pt is now 21 wks and 4 days post op today.  He denies any pain, more weakness if he is holding the Lt arm out to do anything.  Added exercises to improve endurance in shld & OH positions.  Manual performed as noted above.  He continues to demonstrate improvements in activity tolerance & strength.       Plan: Continue per POC, working on progression towards FROM until cleared til next phase.

## 2024-09-05 ENCOUNTER — TREATMENT (OUTPATIENT)
Dept: PHYSICAL THERAPY | Facility: CLINIC | Age: 59
End: 2024-09-05
Payer: COMMERCIAL

## 2024-09-05 DIAGNOSIS — M47.812 CERVICAL SPONDYLOSIS WITHOUT MYELOPATHY: ICD-10-CM

## 2024-09-05 DIAGNOSIS — M50.321 DEGENERATION OF INTERVERTEBRAL DISC AT C4-C5 LEVEL: ICD-10-CM

## 2024-09-05 PROCEDURE — 97110 THERAPEUTIC EXERCISES: CPT | Mod: GP

## 2024-09-05 PROCEDURE — 97140 MANUAL THERAPY 1/> REGIONS: CPT | Mod: GP

## 2024-09-05 PROCEDURE — 97530 THERAPEUTIC ACTIVITIES: CPT | Mod: GP

## 2024-09-05 NOTE — PROGRESS NOTES
Physical Therapy Treatment Note     Patient Name: Arian Forde  MRN: [id number]  Today's Date: 9-5-24     Time Calculation  Start Time: 1700  Stop Time: 1800  Time Calculation (min): 60 min  PT Therapeutic Procedures Time Entry  Manual Therapy Time Entry: 20  Therapeutic Exercise Time Entry: 22  Therapeutic Activity Time Entry: 18    Visit: 32/MN  Referred by:  Garielle Lopresti, PA-C, (Dr. Horacio Mukherjee, surgeon)  Insurance:  [facility]   Certification Dates:  5-20-24 to 9-9-24    Current Problem: [ M75.122]    1. Nontraumatic complete tear of left rotator cuff        2. Cervical spondylosis without myelopathy  Follow Up In Physical Therapy      3. Degeneration of intervertebral disc at C4-C5 level  Follow Up In Physical Therapy            Surgery: s/p Lt shoulder arthroscopy, RTCR (subscapularis & supraspinatus), extensive intra-articular debridement & synovectomy, SAD, acromioplasty & open subpectoral biceps tenodesis on 4/5/24     Operative Findings: Full-thickness supraspinatus tear, full-thickness upper border subscapularis tear, degenerative SLAP tear and biceps tenosynovitis, subacromial impingement/bursitis     Subjective:  Pt stated left shoulder is doing better than he expected, very pleased with progress to date.   Patient's Living Environment/PLOF: Pt lives with spouse and son, has daughter in [employer].  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, RN    Patient's Therapy Goals: to return to baseline/PLOF for home & previous activities, yardwork, home maintenance w/o limitation     Pain:  Intensity: 0/10            Location: Left Shoulder, diffuse, more soreness than pain            Duration: Intermittent, very infrequent            Aggravating Factor: movement, position, heavier loads            Alleviating Factor:  OTC meds, prn    HPI:  Pt had Lt RTCT for awhile (degenerative) & previous Lt distal biceps repair, found to have large spur  Social Factors:  1-2 personal factors  &/or comorbidities     Precautions:  Per Protocol, RTW:  Tentative 8-5-24 (Pt reports there is no light duty at work)    Objective     HEP Compliance:  100%    Exercises:      Std Flex 2 x10, 5 sec, 3 lb  Std Scaption 10 x 5 sec, 3 lb  IR, 30 lbs, 3 sec hold, 2 x 15 sec  Rows, 2 x 15, 40 lbs  ER stretch @ 90 abd, 2 lbs, 10 x 10 sec  Horizontal Abd/Add, 2 x 15, 20 lbs  Prone posterior Delt raises on P-ball, 3 lbs, 2 x 10  Prone ER/Abd on P-ball, 3 lbs, 2 x 10  High to Low Diagonal, with thumb rotation, 4 lbs, 2 x 15   Low to High Diagonal, with thumb rotation, 4 lbs, 2 x 15     Therapeutic Activity:    Walk Outs on P-Ball with pushup, orange, 2 x 12  Rotational planks, 2 x 10 each  Walk up on 3 inch step, 2 x 30 sec each  UBE, Forward, Bkwd, 2.5 lbs, 2 min each  Body Blade, 2 positions at neutral, 2 at shld level, 2 x 30 sec each    Manual:  PROM into shld flexion, and ER at 90 degrees abd, pt supine, light distraction,  upper trap stretching, scapular mobs in right sidelying, PA mobs C6-T5, prone ER @ 90 abd, cervical distraction, grade V manip (B) for improved rotation, + cavitations to Lt    Assessment:     Pt was out in the yard all day, his neck & upper thoracic are tight.  He denies any pain.  Manual performed as noted above.  He continues to demonstrate improvements in activity tolerance & strength.       Plan: Continue per POC, working on progression towards FROM until cleared til next phase.

## 2024-09-10 ENCOUNTER — TREATMENT (OUTPATIENT)
Dept: PHYSICAL THERAPY | Facility: CLINIC | Age: 59
End: 2024-09-10
Payer: COMMERCIAL

## 2024-09-10 DIAGNOSIS — M75.122 NONTRAUMATIC COMPLETE TEAR OF LEFT ROTATOR CUFF: Primary | ICD-10-CM

## 2024-09-10 DIAGNOSIS — M50.321 DEGENERATION OF INTERVERTEBRAL DISC AT C4-C5 LEVEL: ICD-10-CM

## 2024-09-10 DIAGNOSIS — M47.812 CERVICAL SPONDYLOSIS WITHOUT MYELOPATHY: ICD-10-CM

## 2024-09-10 PROCEDURE — 97530 THERAPEUTIC ACTIVITIES: CPT | Mod: GP

## 2024-09-10 PROCEDURE — 97110 THERAPEUTIC EXERCISES: CPT | Mod: GP

## 2024-09-10 PROCEDURE — 97140 MANUAL THERAPY 1/> REGIONS: CPT | Mod: GP

## 2024-09-10 NOTE — PROGRESS NOTES
Physical Therapy Treatment Note     Patient Name: Arian Forde  MRN: [id number]  Today's Date: 9/10/2024     Time Calculation  Start Time: 1800  Stop Time: 1905  Time Calculation (min): 65 min  PT Therapeutic Procedures Time Entry  Manual Therapy Time Entry: 20  Therapeutic Exercise Time Entry: 20  Therapeutic Activity Time Entry: 25  Visit: 33/MN  Referred by:  Garielle Lopresti, PA-C, (Dr. Horacio Mukherjee, surgeon)  Insurance:  [facility]   Certification Dates:  5-20-24 to 12-31-24    Current Problem: [ M75.122]    1. Nontraumatic complete tear of left rotator cuff              Surgery: s/p Lt shoulder arthroscopy, RTCR (subscapularis & supraspinatus), extensive intra-articular debridement & synovectomy, SAD, acromioplasty & open subpectoral biceps tenodesis on 4/5/24     Operative Findings: Full-thickness supraspinatus tear, full-thickness upper border subscapularis tear, degenerative SLAP tear and biceps tenosynovitis, subacromial impingement/bursitis     Subjective:  Pt stated left shoulder was sore after yard work in addition to therapy.  Patient's Living Environment/PLOF: Pt lives with spouse and son, has daughter in [employer].  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, RN    Patient's Therapy Goals: to return to baseline/PLOF for home & previous activities, yardwork, home maintenance w/o limitation     Pain:  Intensity: 0/10            Location: Left Shoulder, diffuse, more soreness than pain            Duration: Intermittent, very infrequent            Aggravating Factor: movement, position, heavier loads            Alleviating Factor:  OTC meds, prn    HPI:  Pt had Lt RTCT for awhile (degenerative) & previous Lt distal biceps repair, found to have large spur  Social Factors:  1-2 personal factors &/or comorbidities     Precautions:  Per Protocol, RTW:  Tentative 8-5-24 (Pt reports there is no light duty at work)    Objective     HEP Compliance:  100%    Exercises:      Std Flex 2  x10, 5 sec, 3 lb  Std Scaption 10 x 5 sec, 3 lb  IR, 30 lbs, 3 sec hold, 2 x 15 sec  Rows, 2 x 15, 40 lbs  ER stretch @ 90 abd, 2 lbs, 10 x 10 sec  Horizontal Abd/Add, 2 x 15, 20 lbs  Prone posterior Delt raises on P-ball, 3 lbs, 2 x 10  Prone ER/Abd on P-ball, 3 lbs, 2 x 10  High to Low Diagonal, with thumb rotation, 4 lbs, 2 x 15   Low to High Diagonal, with thumb rotation, 4 lbs, 2 x 15     Therapeutic Activity:    Walk Outs on P-Ball with pushup, orange, 2 x 12  Rotational planks, 2 x 10 each  Walk up on 3 inch step, 2 x 30 sec each  UBE, Forward, Bkwd, 2.5 lbs, 2 min each  Body Blade, 2 positions at neutral, 2 at shld level, 2 x 30 sec each  Static holds for muscle endurance, in Lt shld flexion at shoulder level & OH, lbs, 2-3 x 30-40 sec    Endurance Test: 30 push ups on P-Ball    Manual:  PROM into shld flexion, and ER at 90 degrees abd, pt supine, light distraction,  upper trap stretching, scapular mobs in right sidelying, PA mobs C6-T5, prone ER @ 90 abd, cervical distraction, grade V manip (B) for improved rotation, + cavitations to Lt    Assessment:     Pt stated he is still limited with activities at shoulder level and above, but stated improvements over time, he is able to tolerate greater volume of work overall.  He denies any pain more weakness.  Manual performed as noted above.  He continues to demonstrate improvements in activity tolerance & strength.       Plan: Continue per POC, working on progression towards FROM until cleared til next phase.

## 2024-09-12 ENCOUNTER — APPOINTMENT (OUTPATIENT)
Dept: PHYSICAL THERAPY | Facility: CLINIC | Age: 59
End: 2024-09-12
Payer: COMMERCIAL

## 2024-09-17 ENCOUNTER — TREATMENT (OUTPATIENT)
Dept: PHYSICAL THERAPY | Facility: CLINIC | Age: 59
End: 2024-09-17
Payer: COMMERCIAL

## 2024-09-17 DIAGNOSIS — M50.321 DEGENERATION OF INTERVERTEBRAL DISC AT C4-C5 LEVEL: ICD-10-CM

## 2024-09-17 DIAGNOSIS — M47.812 CERVICAL SPONDYLOSIS WITHOUT MYELOPATHY: ICD-10-CM

## 2024-09-17 PROCEDURE — 97530 THERAPEUTIC ACTIVITIES: CPT | Mod: GP

## 2024-09-17 PROCEDURE — 97110 THERAPEUTIC EXERCISES: CPT | Mod: GP

## 2024-09-17 PROCEDURE — 97140 MANUAL THERAPY 1/> REGIONS: CPT | Mod: GP

## 2024-09-17 NOTE — PROGRESS NOTES
Physical Therapy Treatment Note     Patient Name: Arian Forde  MRN: [id number]  Today's Date: 9-17-24     Time Calculation  Start Time: 1818  Stop Time: 1915  Time Calculation (min): 57 min  PT Therapeutic Procedures Time Entry  Manual Therapy Time Entry: 14  Therapeutic Exercise Time Entry: 18  Therapeutic Activity Time Entry: 25    Visit: 34/MN  Referred by:  Garielle Lopresti, PA-C, (Dr. Horacio Mukherjee, surgeon)  Insurance:  [facility]   Certification Dates:  5-20-24 to 12-31-24    Current Problem: [ M75.122]    1. Nontraumatic complete tear of rotator cuff, left        2. Cervical spondylosis without myelopathy  Follow Up In Physical Therapy      3. Degeneration of intervertebral disc at C4-C5 level  Follow Up In Physical Therapy              Surgery: s/p Lt shoulder arthroscopy, RTCR (subscapularis & supraspinatus), extensive intra-articular debridement & synovectomy, SAD, acromioplasty & open subpectoral biceps tenodesis on 4/5/24     Operative Findings: Full-thickness supraspinatus tear, full-thickness upper border subscapularis tear, degenerative SLAP tear and biceps tenosynovitis, subacromial impingement/bursitis     Subjective:  Pt stated left shoulder was sore after yard work in addition to therapy.  Patient's Living Environment/PLOF: Pt lives with spouse and son, has daughter in [employer].  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, RN    Patient's Therapy Goals: to return to baseline/PLOF for home & previous activities, yardwork, home maintenance w/o limitation     Pain:  Intensity: 0/10            Location: Left Shoulder, diffuse, more soreness than pain            Duration: Intermittent, very infrequent            Aggravating Factor: movement, position, heavier loads            Alleviating Factor:  OTC meds, prn    HPI:  Pt had Lt RTCT for awhile (degenerative) & previous Lt distal biceps repair, found to have large spur  Social Factors:  1-2 personal factors &/or  comorbidities     Precautions:  Per Protocol, RTW:  Tentative 8-5-24 (Pt reports there is no light duty at work)    Objective     HEP Compliance:  100%    Exercises:      Std Flex 2 x10, 5 sec, 3 lb  Std Scaption 10 x 5 sec, 3 lb  IR, 30 lbs, 3 sec hold, 2 x 15 sec  Rows, 2 x 15, 40 lbs  ER stretch @ 90 abd, 2 lbs, 10 x 10 sec  Horizontal Abd/Add, 2 x 15, 20 lbs  Prone posterior Delt raises on P-ball, 3 lbs, 2 x 10  Prone ER/Abd on P-ball, 3 lbs, 2 x 10  High to Low Diagonal, with thumb rotation, 4 lbs, 2 x 15   Low to High Diagonal, with thumb rotation, 4 lbs, 2 x 15     Therapeutic Activity:      Walk Outs on P-Ball with pushup, orange, 2 x 12  Rotational planks, 2 x 10 each  Walk up on 3 inch step, 2 x 30 sec each  UBE, Forward, Bkwd, 2.5 lbs, 2 min each  Body Blade, 2 positions at neutral, 2 at shld level, 2 x 30 sec each  Static holds for muscle endurance, in Lt shld flexion at shoulder level & OH, 8 lbs, 2-3 x 30-40 sec    Endurance Test: 30 push ups on P-Ball    Manual:  PROM into shld flexion, and ER at 90 degrees abd, pt supine, light distraction,  upper trap stretching, scapular mobs in right sidelying, PA mobs C6-T5, prone ER @ 90 abd, cervical distraction, grade V manip (B) for improved rotation, + cavitations to Lt    Assessment:     Pt stated he is still limited with activities at shoulder level and above, but strength and endurance are improving.  He denies any pain more weakness.  Manual performed as noted above.  He continues to demonstrate improvements in activity tolerance & strength.       Plan: Continue per POC, working on progression towards FROM until cleared til next phase.

## 2024-09-19 ENCOUNTER — TREATMENT (OUTPATIENT)
Dept: PHYSICAL THERAPY | Facility: CLINIC | Age: 59
End: 2024-09-19
Payer: COMMERCIAL

## 2024-09-19 DIAGNOSIS — M50.321 DEGENERATION OF INTERVERTEBRAL DISC AT C4-C5 LEVEL: ICD-10-CM

## 2024-09-19 DIAGNOSIS — M47.812 CERVICAL SPONDYLOSIS WITHOUT MYELOPATHY: ICD-10-CM

## 2024-09-19 PROCEDURE — 97530 THERAPEUTIC ACTIVITIES: CPT | Mod: GP

## 2024-09-19 PROCEDURE — 97140 MANUAL THERAPY 1/> REGIONS: CPT | Mod: GP

## 2024-09-19 PROCEDURE — 97110 THERAPEUTIC EXERCISES: CPT | Mod: GP

## 2024-09-19 NOTE — PROGRESS NOTES
Physical Therapy Treatment Note     Patient Name: Arian Forde  MRN: [id number]  Today's Date: 9-19-24     Time Calculation  Start Time: 1728  Stop Time: 1830  Time Calculation (min): 62 min  PT Therapeutic Procedures Time Entry  Manual Therapy Time Entry: 15  Therapeutic Exercise Time Entry: 23  Therapeutic Activity Time Entry: 22    Visit: 35/MN  Referred by:  Garielle Lopresti, PA-C, (Dr. Horacio Mukherjee, surgeon)  Insurance:  [facility]   Certification Dates:  5-20-24 to 12-31-24    Current Problem: [ M75.122]    1. Nontraumatic complete tear of left rotator cuff        2. Cervical spondylosis without myelopathy  Follow Up In Physical Therapy      3. Degeneration of intervertebral disc at C4-C5 level  Follow Up In Physical Therapy            Surgery: s/p Lt shoulder arthroscopy, RTCR (subscapularis & supraspinatus), extensive intra-articular debridement & synovectomy, SAD, acromioplasty & open subpectoral biceps tenodesis on 4/5/24     Operative Findings: Full-thickness supraspinatus tear, full-thickness upper border subscapularis tear, degenerative SLAP tear and biceps tenosynovitis, subacromial impingement/bursitis     Subjective:  Pt stated left shoulder was sore after yard work in addition to therapy.  Patient's Living Environment/PLOF: Pt lives with spouse and son, has daughter in [employer].  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, RN    Patient's Therapy Goals: to return to baseline/PLOF for home & previous activities, yardwork, home maintenance w/o limitation     Pain:  Intensity: 0/10            Location: Lt Shoulder, diffuse, more soreness than pain            Duration: Intermittent, very infrequent            Aggravating Factor: movement, static position under load, OH with load             Alleviating Factor:  OTC meds, prn    HPI:  Pt had Lt RTCT for awhile (degenerative) & previous Lt distal biceps repair, found to have large spur  Social Factors:  1-2 personal factors  &/or comorbidities     Precautions:  Per Protocol, RTW:  Tentative 8-5-24 (Pt reports there is no light duty at work)    Objective     HEP Compliance:  100%    Exercises:      Std Flex 2 x10, 5 sec, 3 lb  Std Scaption 10 x 5 sec, 3 lb  IR, 30 lbs, 3 sec hold, 2 x 15 sec  Rows, 2 x 15, 40 lbs  ER stretch @ 90 abd, 2 lbs, 10 x 10 sec  Horizontal Abd/Add, 2 x 15, 20 lbs  Prone posterior Delt raises on P-ball, 3 lbs, 2 x 10  Prone ER/Abd on P-ball, 3 lbs, 2 x 10  High to Low Diagonal, with thumb rotation, 4 lbs, 2 x 15   Low to High Diagonal, with thumb rotation, 4 lbs, 2 x 15     Therapeutic Activity:      Walk Outs on P-Ball with pushup, purple, 2 x 10  Rotational planks, 2 x 10 each  Walk up on 3 inch step, 2 x 30 sec each  UBE, Forward, Bkwd, 2.5 lbs, 2 min each  Body Blade, 2 positions at neutral, 2 at shld level, 2 x 30 sec each  Static holds for muscle endurance, in Lt shld flexion at shoulder level & OH, 8 lbs, 2-3 x 30-40 sec  Shld Flex, 1 x 45 sec, 8 lbs, 1 x 40 sec, 8 lbs  OH, 1 x 40 sec, 8 lbs, 54 sec  Rotational Planks, 8 lbs, 2 x 10    Manual:  PROM into shld flexion, and ER at 90 degrees abd, pt supine, light distraction,  upper trap stretching, scapular mobs in right sidelying, PA mobs C6-T5, prone ER @ 90 abd, cervical distraction, grade V manip (B) for improved rotation  Assessment:     Pt stated overall strength and endurance have improved significantly, but there are still deficits.  He denies any pain more soreness after activity and weakness.  Manual performed as noted above.  He continues to demonstrate improvements in activity tolerance & strength.       Plan: Continue per POC, working on progression towards FROM until cleared til next phase.

## 2024-09-24 ENCOUNTER — TREATMENT (OUTPATIENT)
Dept: PHYSICAL THERAPY | Facility: CLINIC | Age: 59
End: 2024-09-24
Payer: COMMERCIAL

## 2024-09-24 DIAGNOSIS — M75.122 NONTRAUMATIC COMPLETE TEAR OF LEFT ROTATOR CUFF: Primary | ICD-10-CM

## 2024-09-24 DIAGNOSIS — M50.321 DEGENERATION OF INTERVERTEBRAL DISC AT C4-C5 LEVEL: ICD-10-CM

## 2024-09-24 DIAGNOSIS — M47.812 CERVICAL SPONDYLOSIS WITHOUT MYELOPATHY: ICD-10-CM

## 2024-09-24 PROCEDURE — 97110 THERAPEUTIC EXERCISES: CPT | Mod: GP

## 2024-09-24 PROCEDURE — 97530 THERAPEUTIC ACTIVITIES: CPT | Mod: GP

## 2024-09-24 NOTE — PROGRESS NOTES
Physical Therapy Treatment Note     Patient Name: Arian Forde  MRN: [id number]  Today's Date: 9-24-24     Time Calculation  Start Time: 1745  Stop Time: 1845  Time Calculation (min): 60 min  PT Therapeutic Procedures Time Entry  Therapeutic Exercise Time Entry: 25  Therapeutic Activity Time Entry: 35    Visit: 36/MN  Referred by:  Garielle Lopresti, PA-C, (Dr. Horacio Mukherjee, surgeon)  Insurance:  [facility]   Certification Dates:  5-20-24 to 12-31-24    Current Problem: [ M75.122]    1. Nontraumatic complete tear of left rotator cuff        2. Cervical spondylosis without myelopathy  Follow Up In Physical Therapy      3. Degeneration of intervertebral disc at C4-C5 level  Follow Up In Physical Therapy            Surgery: s/p Lt shoulder arthroscopy, RTCR (subscapularis & supraspinatus), extensive intra-articular debridement & synovectomy, SAD, acromioplasty & open subpectoral biceps tenodesis on 4/5/24     Operative Findings: Full-thickness supraspinatus tear, full-thickness upper border subscapularis tear, degenerative SLAP tear and biceps tenosynovitis, subacromial impingement/bursitis     Subjective:  Pt stated left shoulder was sore after yard work in addition to therapy.  Patient's Living Environment/PLOF: Pt lives with spouse and son, has daughter in [employer].  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, RN    Patient's Therapy Goals: to return to baseline/PLOF for home & previous activities, yardwork, home maintenance w/o limitation     Pain:  Intensity: 0/10            Location: Lt Shoulder, diffuse, more soreness than pain            Duration: Intermittent, very infrequent            Aggravating Factor: movement, static position under load, OH with load             Alleviating Factor:  OTC meds, prn    HPI:  Pt had Lt RTCT for awhile (degenerative) & previous Lt distal biceps repair, found to have large spur  Social Factors:  1-2 personal factors &/or comorbidities      Precautions:  Per Protocol, RTW:  Tentative 8-5-24 (Pt reports there is no light duty at work)    Objective     HEP Compliance:  100%    Exercises:      Std Flex 2 x10, 5 sec, 3 lb  Std Scaption 10 x 5 sec, 3 lb  IR, 30 lbs, 3 sec hold, 2 x 15 sec  Rows, 2 x 15, 40 lbs  ER stretch @ 90 abd, 2 lbs, 10 x 10 sec  Horizontal Abd/Add, 2 x 15, 20 lbs  Prone posterior Delt raises on P-ball, 3 lbs, 2 x 10  Prone ER/Abd on P-ball, 3 lbs, 2 x 10  High to Low Diagonal, with thumb rotation, 4 lbs, 2 x 15   Low to High Diagonal, with thumb rotation, 4 lbs, 2 x 15     Therapeutic Activity:      Walk Outs on P-Ball with pushup, purple, 2 x 10  Rotational planks, 2 x 10 each  Walk up on 3 inch step, 2 x 30 sec each  UBE, Forward, Bkwd, 2.5 lbs, 2 min each  Body Blade, 2 positions at neutral, 2 at shld level, 2 x 30 sec each  Static holds for muscle endurance, in Lt shld flexion at shoulder level & OH, 8 lbs, 2-3 x 30-40 sec  Shld Flex, 1 x 45 sec, 8 lbs, 1 x 40 sec, 8 lbs  OH, 1 x 40 sec, 8 lbs, 54 sec  Rotational Planks, 8 lbs, 2 x 10    Manual:  PROM into shld flexion, and ER at 90 degrees abd, pt supine, light distraction,  upper trap stretching, scapular mobs in right sidelying, PA mobs C6-T5, prone ER @ 90 abd, cervical distraction, grade V manip (B) for improved rotation    Assessment:     Pt stated overall strength and endurance have improved significantly, he has been able to carry bags on each arm without any trouble this week.  Manual performed as noted above.  He continues to demonstrate improvements in activity tolerance & strength.  Follow up appt 9-30-24, will assess work restrictions.      Plan: Continue per POC, working on progression towards FROM until cleared til next phase.

## 2024-09-26 ENCOUNTER — TREATMENT (OUTPATIENT)
Dept: PHYSICAL THERAPY | Facility: CLINIC | Age: 59
End: 2024-09-26
Payer: COMMERCIAL

## 2024-09-26 DIAGNOSIS — M75.122 NONTRAUMATIC COMPLETE TEAR OF LEFT ROTATOR CUFF: Primary | ICD-10-CM

## 2024-09-26 PROCEDURE — 97530 THERAPEUTIC ACTIVITIES: CPT | Mod: GP

## 2024-09-26 PROCEDURE — 97110 THERAPEUTIC EXERCISES: CPT | Mod: GP

## 2024-09-26 PROCEDURE — 97140 MANUAL THERAPY 1/> REGIONS: CPT | Mod: GP

## 2024-09-26 NOTE — PROGRESS NOTES
Physical Therapy Treatment Note     Patient Name: Arian Forde  MRN: [id number]  Today's Date: 9/27/2024     Time Calculation  Start Time: 1815  Stop Time: 1915  Time Calculation (min): 60 min  PT Therapeutic Procedures Time Entry  Manual Therapy Time Entry: 10  Therapeutic Exercise Time Entry: 20  Therapeutic Activity Time Entry: 30    Visit: 37/MN  Referred by:  Garielle Lopresti, PA-C, (Dr. Horacio Mukherjee, surgeon)  [facility]   Certification Dates:  5-20-24 to 12-31-24    Current Problem: [ M75.122]    1. Nontraumatic complete tear of left rotator cuff              Surgery: s/p Lt shoulder arthroscopy, RTCR (subscapularis & supraspinatus), extensive intra-articular debridement & synovectomy, SAD, acromioplasty & open subpectoral biceps tenodesis on 4/5/24     Operative Findings: Full-thickness supraspinatus tear, full-thickness upper border subscapularis tear, degenerative SLAP tear and biceps tenosynovitis, subacromial impingement/bursitis     Subjective:  Pt stated left shoulder was sore after yard work in addition to therapy.  Patient's Living Environment/PLOF: Pt lives with spouse and son, has daughter in [employer].  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, RN    Patient's Therapy Goals: to return to baseline/PLOF for home & previous activities, yardwork, home maintenance w/o limitation     Pain:  Intensity: 0/10            Location: Lt Shoulder, diffuse, more soreness than pain            Duration: Intermittent, very infrequent            Aggravating Factor: movement, static position under load, OH with load             Alleviating Factor:  OTC meds, prn    HPI:  Pt had Lt RTCT for awhile (degenerative) & previous Lt distal biceps repair, found to have large spur  Social Factors:  1-2 personal factors &/or comorbidities     Precautions:  Per Protocol, RTW:  Tentative 8-5-24 (Pt reports there is no light duty at work)    Objective     Shl AROM: (degrees) Left    Flexion   180   Abduction 170/177   ER @ Neutral 80   ER @ 90 Abd 90   ER, Functional T2   IR, Functional T9   IR, @ 90 Abd 90      Shoulder Strength: MMT Left   Flexion 5/5   C5 Abduction 5/5   ER 5/5   IR 5/5   C6, Elbow Flex, Wrist Ext 5/5   C7, Elbow Ext, Wrist Flex 5/5   *denotes pain with motion or muscle testing    HEP Compliance:  100%    Exercises:      Std Flex 2 x10, 5 sec, 3 lb  Std Scaption 10 x 5 sec, 3 lb  IR, 30 lbs, 3 sec hold, 2 x 15 sec  Rows, 2 x 15, 40 lbs  ER stretch @ 90 abd, 2 lbs, 10 x 10 sec  Horizontal Abd/Add, 2 x 15, 20 lbs  Prone posterior Delt raises on P-ball, 3 lbs, 2 x 10  Prone ER/Abd on P-ball, 3 lbs, 2 x 10  High to Low Diagonal, with thumb rotation, 4 lbs, 2 x 15   Low to High Diagonal, with thumb rotation, 4 lbs, 2 x 15     Therapeutic Activity:      Walk Outs on P-Ball with pushup, purple, 2 x 10  Rotational planks, 2 x 10 each  Walk up on 3 inch step, 2 x 30 sec each  UBE, Forward, Bkwd, 2.5 lbs, 2 min each  Body Blade, 2 positions at neutral, 2 at shld level, 2 x 30 sec each  Static holds for muscle endurance, in Lt shld flexion at shoulder level & OH, 8 lbs, 2-3 x 30-40 sec  Shld Flex, 1 x 45 sec, 8 lbs, 1 x 40 sec, 8 lbs  OH, 1 x 40 sec, 8 lbs, 54 sec  Rotational Planks, 8 lbs, 2 x 10  BOSU Push Ups    Manual:  PROM into shld flexion, and ER at 90 degrees abd, pt supine, light distraction,  upper trap stretching, scapular mobs in right sidelying, PA mobs C6-T5, prone ER @ 90 abd, cervical distraction, grade V manip (B) for improved rotation    Assessment:     Pt demonstrates Lt shoulder strength when measured isometrically, however, he fatigues quickly and functionally does not demonstrate strength equal to his CL side, the nondominant side.  He has made progress to date towards all goals.  Manual performed as noted above.  He continues to demonstrate improvements in activity tolerance & strength.  Follow up appt 9-30-24, will assess work restrictions.      Plan:  Continue per POC, working on progression towards FROM until cleared til next phase.      PT Goal 1         Start:  05/20/24    Expected End:  12-31-24       Pt will decrease pain, Lt shld, to < or = 2-3/10 to improve tolerance to ADL's, work tasks at home & work.             PT Goal 2         Start:  05/20/24    Expected End:  12-31-24       Pt will improve Lt shld AROM to w/in 10-15 degrees AROM all movements to improve functional reaching, OH activities, dressing.             PT Goal 3         Start:  05/20/24    Expected End:  12-31-24     Pt will decrease Quick Dash score to < or = 20% to demonstrate functional improvement.                PT Goal 4, Start:  05/20/24    Expected End:  12-31-24         Patient will increase Lt UE/shld strength to > or  +4/5 to improve functional use of his dominant side for work & heavier ADL's.

## 2024-09-30 ENCOUNTER — APPOINTMENT (OUTPATIENT)
Dept: ORTHOPEDIC SURGERY | Facility: CLINIC | Age: 59
End: 2024-09-30
Payer: COMMERCIAL

## 2024-09-30 VITALS — WEIGHT: 185 LBS | BODY MASS INDEX: 28.04 KG/M2 | HEIGHT: 68 IN

## 2024-09-30 DIAGNOSIS — M75.122 NONTRAUMATIC COMPLETE TEAR OF LEFT ROTATOR CUFF: ICD-10-CM

## 2024-09-30 PROCEDURE — 99213 OFFICE O/P EST LOW 20 MIN: CPT | Performed by: STUDENT IN AN ORGANIZED HEALTH CARE EDUCATION/TRAINING PROGRAM

## 2024-09-30 PROCEDURE — 3008F BODY MASS INDEX DOCD: CPT | Performed by: STUDENT IN AN ORGANIZED HEALTH CARE EDUCATION/TRAINING PROGRAM

## 2024-09-30 ASSESSMENT — PAIN - FUNCTIONAL ASSESSMENT: PAIN_FUNCTIONAL_ASSESSMENT: NO/DENIES PAIN

## 2024-09-30 NOTE — LETTER
September 30, 2024     Patient: Arian Forde   YOB: 1965   Date of Visit: 9/30/2024       To Whom it May Concern:    Arian Forde was seen in my clinic on 9/30/2024. He may return to work without restrictions. Any questions or concerns please call us at [phone]          Sincerely,          Horacio Mukherjee MD        CC: No Recipients

## 2024-09-30 NOTE — PROGRESS NOTES
PRIMARY CARE PHYSICIAN: Nakul Meyer MD    ORTHOPAEDIC POSTOPERATIVE VISIT    ASSESSMENT & PLAN    Impression: 58 y.o. male 6 months postop s/p Left shoulder arthroscopy, rotator cuff repair, intra-articular debridement and synovectomy, subacromial decompression and acromioplasty and open subpectoral biceps tenodesis done 4/5/24.    Plan:   Overall Arian is doing well.  He will continue working with physical therapy through the post-operative protocol for the above as he transitions to exercises on his own.  He can return to work at this time without restrictions.  Overall he has done very well and is happy with his outcome.  He will return to see me as needed.    I reviewed their postoperative timeline and plan with them. They understand the postoperative precautions and the treatment plan going forward.     Follow-Up: Patient will follow-up as needed    At the end of the visit, all questions were answered in full. The patient is in agreement with the plan and recommendations. They will call the office with any questions/concerns.      Note dictated with [employer] software. Completed without full typed error editing and sent to avoid delay.      SUBJECTIVE  CHIEF COMPLAINT: Post-op     HPI: Arian Forde is a 58 y.o. patient now 6 months postop status post Left shoulder arthroscopy, rotator cuff repair, intra-articular debridement and synovectomy, subacromial decompression and acromioplasty and open subpectoral biceps tenodesis done 4/5/24. He is still doing physical therapy at [facility]. Pain is well controlled. He is overall doing well. No concerns.      7/1/2024 HPI Visit Info:  He is doing well overall. His pain is well controlled. He is improving well in therapy.      REVIEW OF SYSTEMS  Constitutional: See HPI for pain assessment, No significant recent weight gain or loss, no fever or chills  Cardiovascular: No chest pain, shortness of breath  Respiratory: No difficulty breathing, no  "cough  Gastrointestinal: No nausea, vomiting, diarrhea, constipation  Musculoskeletal: Noted in HPI, positive for pain, restricted motion, stiffness  Integumentary: No rashes, easy bruising or skin lesions  Neurological: No headache, no numbness or tingling in extremities  Psychiatric: No mood changes or memory changes. No social issues  Heme/Lymph: No excessive swelling, easy bruising or excessive bleeding  ENT: No hearing changes, no nosebleeds  Eyes: No vision changes    CURRENT MEDICATIONS:   Current Outpatient Medications   Medication Sig Dispense Refill    tiZANidine (Zanaflex) 4 mg tablet Take 1 tablet (4 mg) by mouth as needed at bedtime.       No current facility-administered medications for this visit.        OBJECTIVE    PHYSICAL EXAM      4/5/2024    12:20 PM 4/5/2024    12:39 PM 4/5/2024     1:00 PM 4/22/2024     9:54 AM 5/20/2024    11:40 AM 7/1/2024     1:35 PM 8/27/2024     3:49 PM   Vitals   Systolic 126 135 165       Diastolic 80 88 89       Heart Rate 59 56 57       Temp 36 °C (96.8 °F) 36 °C (96.8 °F) 36.2 °C (97.2 °F)       Resp 12 16 16       Height (in)    1.725 m (5' 7.91\") 1.725 m (5' 7.91\") 1.727 m (5' 8\") 1.727 m (5' 8\")   Weight (lb)    194 185 185 185   BMI    29.58 kg/m2 28.2 kg/m2 28.13 kg/m2 28.13 kg/m2   BSA (m2)    2.05 m2 2 m2 2.01 m2 2.01 m2   Visit Report    Report Report Report Report      There is no height or weight on file to calculate BMI.    General: Well-appearing, no acute distress    Skin intact bilateral upper and lower extremities  No erythema  No warmth    Detailed examination of the left shoulder demonstrates:  Surgical incision(s) well healed  No erythema or warmth  No ecchymosis or soft tissue swelling  Biceps contour normal  Shoulder range of motion: forward flexion 170, ER 40, IR mid lumbar  Good 5-/5 strength with Jobes, ER and belly press testing  Upper extremity motor grossly intact  C5-T1 sensation intact bilaterally  2+ radial pulses bilaterally  Warm and " well-perfused, brisk capillary refill    IMAGING:   No new imaging

## 2024-10-01 ENCOUNTER — APPOINTMENT (OUTPATIENT)
Dept: PHYSICAL THERAPY | Facility: CLINIC | Age: 59
End: 2024-10-01
Payer: COMMERCIAL

## 2024-10-03 ENCOUNTER — TREATMENT (OUTPATIENT)
Dept: PHYSICAL THERAPY | Facility: CLINIC | Age: 59
End: 2024-10-03
Payer: COMMERCIAL

## 2024-10-03 DIAGNOSIS — M50.321 DEGENERATION OF INTERVERTEBRAL DISC AT C4-C5 LEVEL: ICD-10-CM

## 2024-10-03 DIAGNOSIS — M47.812 CERVICAL SPONDYLOSIS WITHOUT MYELOPATHY: ICD-10-CM

## 2024-10-03 PROCEDURE — 97140 MANUAL THERAPY 1/> REGIONS: CPT | Mod: GP

## 2024-10-03 PROCEDURE — 97110 THERAPEUTIC EXERCISES: CPT | Mod: GP

## 2024-10-03 PROCEDURE — 97530 THERAPEUTIC ACTIVITIES: CPT | Mod: GP

## 2024-10-03 NOTE — PROGRESS NOTES
Physical Therapy Treatment Note     Patient Name: Arian Forde  MRN: [id number]  Today's Date: 10/3/2024     Time Calculation  Start Time: 1810  Stop Time: 1910  Time Calculation (min): 60 min  PT Therapeutic Procedures Time Entry  Manual Therapy Time Entry: 15  Therapeutic Exercise Time Entry: 15  Therapeutic Activity Time Entry: 30    Visit: 38/MN  Referred by:  Garielle Lopresti, PA-C, (Dr. Horacio Mukherjee, surgeon)  Insurance:  As Seen on TV [facility]   Certification Dates:  5-20-24 to 12-31-24    Current Problem: [ M75.122]    1. Cervical spondylosis without myelopathy  Follow Up In Physical Therapy      2. Degeneration of intervertebral disc at C4-C5 level  Follow Up In Physical Therapy            Surgery: s/p Lt shoulder arthroscopy, RTCR (subscapularis & supraspinatus), extensive intra-articular debridement & synovectomy, SAD, acromioplasty & open subpectoral biceps tenodesis on 4/5/24     Operative Findings: Full-thickness supraspinatus tear, full-thickness upper border subscapularis tear, degenerative SLAP tear and biceps tenosynovitis, subacromial impingement/bursitis     Subjective:  Pt stated left shoulder was sore after yard work in addition to therapy.    ]Patient's Living Environment/PLOF: Pt lives with spouse and son, has daughter in [employer].  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, RN    Patient's Therapy Goals: to return to baseline/PLOF for home & previous activities, yardwork, home maintenance w/o limitation     Pain:  Intensity: 0/10            Location: Lt Shoulder, diffuse, more soreness than pain            Duration: Intermittent, very infrequent            Aggravating Factor: movement, static position under load, OH with load             Alleviating Factor:  OTC meds, prn    HPI:  Pt had Lt RTCT for awhile (degenerative) & previous Lt distal biceps repair, found to have large spur  Social Factors:  1-2 personal factors &/or comorbidities     Precautions:  Per Protocol,  RTW:  Tentative 8-5-24 (Pt reports there is no light duty at work)    Objective     HEP Compliance:  100%    Exercises:      IR, 30 lbs, 3 sec hold, 2 x 15 sec  Rows, 2 x 15, 40 lbs  ER stretch @ 90 abd, 2 lbs, 10 x 10 sec  Horizontal Abd/Add, 2 x 15, 20 lbs  Prone posterior Delt raises on P-ball, 3 lbs, 2 x 10  Prone ER/Abd on P-ball, 3 lbs, 2 x 10  High to Low Diagonal, with thumb rotation, 4 lbs, 2 x 15   Low to High Diagonal, with thumb rotation, 4 lbs, 2 x 15     Therapeutic Activity:      Walk Outs on P-Ball with pushup, purple, 2 x 10  Rotational planks, 2 x 10 each  Walk up on 3 inch step, 2 x 30 sec each  UBE, Forward, Bkwd, 2.5 lbs, 2 min each  Body Blade, 2 positions at neutral, 2 at shld level, 2 x 30 sec each  Static holds for muscle endurance, in Lt shld flexion at shoulder level & OH, 8 lbs, 2-3 x 30-40 sec  Shld Flex, 1 x 45 sec, 8 lbs, 1 x 40 sec, 8 lbs  OH, 1 x 40 sec, 8 lbs, 54 sec  Rotational Planks, 8 lbs, 2 x 10  BOSU Push Ups  Forward Crawl on Wall, 3 lbs, 20 x  ER @ 90, black, 2 x 15  IR @ 90, black, 2 x 15    Manual:  PROM into shld flexion, and ER at 90 degrees abd, pt supine, light distraction,  upper trap stretching, scapular mobs in right sidelying, PA mobs C6-T5, prone ER @ 90 abd, cervical distraction, grade V manip (B) for improved rotation    Assessment:     Pt has made progress to date towards all goals but still reports tightness and fatigue after usual activities such as working cars, lifting items for maintenance.  Manual performed as noted above.  He continues to demonstrate improvements in activity tolerance & strength.        Plan: Continue per POC, working on progression towards FROM until cleared til next phase.

## 2024-10-10 ENCOUNTER — TREATMENT (OUTPATIENT)
Dept: PHYSICAL THERAPY | Facility: CLINIC | Age: 59
End: 2024-10-10
Payer: COMMERCIAL

## 2024-10-10 DIAGNOSIS — M47.812 CERVICAL SPONDYLOSIS WITHOUT MYELOPATHY: ICD-10-CM

## 2024-10-10 DIAGNOSIS — M50.321 DEGENERATION OF INTERVERTEBRAL DISC AT C4-C5 LEVEL: ICD-10-CM

## 2024-10-10 PROCEDURE — 97530 THERAPEUTIC ACTIVITIES: CPT | Mod: GP

## 2024-10-10 PROCEDURE — 97110 THERAPEUTIC EXERCISES: CPT | Mod: GP

## 2024-10-10 PROCEDURE — 97140 MANUAL THERAPY 1/> REGIONS: CPT | Mod: GP

## 2024-10-10 NOTE — PROGRESS NOTES
Physical Therapy Treatment Note     Patient Name: Arian Forde  MRN: [id number]  Today's Date: 10/10/2024     Time Calculation  Start Time: 1736  Stop Time: 1833  Time Calculation (min): 57 min  PT Therapeutic Procedures Time Entry  Manual Therapy Time Entry: 10  Therapeutic Exercise Time Entry: 15  Therapeutic Activity Time Entry: 30    Visit: 39/MN  Referred by:  Garielle Lopresti, PA-C, (Dr. Horacio Mukherjee, surgeon)  Insurance:  Compl[facility]   Certification Dates:  5-20-24 to 12-31-24    Current Problem: [ M75.122]    1. Cervical spondylosis without myelopathy  Follow Up In Physical Therapy      2. Degeneration of intervertebral disc at C4-C5 level  Follow Up In Physical Therapy            Surgery: s/p Lt shoulder arthroscopy, RTCR (subscapularis & supraspinatus), extensive intra-articular debridement & synovectomy, SAD, acromioplasty & open subpectoral biceps tenodesis on 4/5/24     Operative Findings: Full-thickness supraspinatus tear, full-thickness upper border subscapularis tear, degenerative SLAP tear and biceps tenosynovitis, subacromial impingement/bursitis     Subjective:  Pt stated left shoulder is sore after heavier home & yard work.  He's been getting stuff ready for the winter, moving items in the shed.   Patient's Living Environment/PLOF: Pt lives with spouse & son, has daughter in [employer].  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, RN    Patient's Therapy Goals: to return to baseline/PLOF for home & previous activities, yardwork, home maintenance w/o limitation     Pain:  Intensity: 0/10            Location: Lt Shoulder, diffuse, more soreness than pain            Duration: Intermittent, very infrequent            Aggravating Factor: movement, static position under load, OH with load             Alleviating Factor:  OTC meds, prn    HPI:  Pt had Lt RTCT for awhile (degenerative) & previous Lt distal biceps repair, found to have large spur  Social Factors:   1-2 personal factors &/or comorbidities     Precautions:  Per Protocol, RTW:  Tentative 8-5-24 (Pt reports there is no light duty at work)    Objective     HEP Compliance:  100%    Exercises:      IR, 30 lbs, 3 sec hold, 2 x 15 sec  Rows, 2 x 15, 40 lbs  ER stretch @ 90 abd, 2 lbs, 10 x 10 sec  Horizontal Abd/Add, 2 x 15, 20 lbs  Prone posterior Delt raises on P-ball, 3 lbs, 2 x 10  Prone ER/Abd on P-ball, 3 lbs, 2 x 10  High to Low Diagonal, with thumb rotation, 4 lbs, 2 x 15   Low to High Diagonal, with thumb rotation, 4 lbs, 2 x 15   Std Posterior Delts, 2 x 15, 5 lbs    Therapeutic Activity:      Walk Outs on P-Ball with pushup, purple, 2 x 10  Rotational planks, 2 x 10 each  Walk up on 3 inch step, 2 x 30 sec each  UBE, Forward, Bkwd, 2.5 lbs, 2 min each  Body Blade, 2 positions at neutral, 2 at shld level, 2 x 30 sec each  Shld Flex, 1 x 45 sec, 8 lbs, 1 x 40 sec, 8 lbs  BOSU Push Ups  Forward Crawl on Wall, 3 lbs, 20 x  ER @ 90, black, 2 x 15  IR @ 90, black, 2 x 15  Wall [place], 2(5 lbs), 2 x 15    Manual:  PROM into shld flexion, and ER at 90 degrees abd, pt supine, light distraction,  upper trap stretching, scapular mobs in right sidelying, prone ER @ 90 abd    Assessment:     Pt has made progress to date towards all goals but still reports he is not back to baseline.  He also has tightness and fatigue after usual activities that did not increase fatigue.  Manual performed as noted above.  He continues to demonstrate improvements in activity tolerance & strength.  Discuss DC planning next week.        Plan: Continue per POC, working on progression towards FROM until cleared til next phase.

## 2024-10-17 ENCOUNTER — TREATMENT (OUTPATIENT)
Dept: PHYSICAL THERAPY | Facility: CLINIC | Age: 59
End: 2024-10-17
Payer: COMMERCIAL

## 2024-10-17 DIAGNOSIS — M75.122 NONTRAUMATIC COMPLETE TEAR OF LEFT ROTATOR CUFF: Primary | ICD-10-CM

## 2024-10-17 PROCEDURE — 97140 MANUAL THERAPY 1/> REGIONS: CPT | Mod: GP

## 2024-10-17 PROCEDURE — 97110 THERAPEUTIC EXERCISES: CPT | Mod: GP

## 2024-10-17 PROCEDURE — 97530 THERAPEUTIC ACTIVITIES: CPT | Mod: GP

## 2024-10-17 NOTE — PROGRESS NOTES
Physical Therapy Treatment / Progress Note     Patient Name: Arian Forde  MRN: [id number]  Today's Date: 10/17/2024     Time Calculation  Start Time: 1810  Stop Time: 1910  Time Calculation (min): 60 min  PT Therapeutic Procedures Time Entry  Manual Therapy Time Entry: 15  Therapeutic Exercise Time Entry: 30  Therapeutic Activity Time Entry: 15    Visit: 40/MN  Referred by:  Garielle Lopresti, PA-C, (Dr. Horacio Mukherjee, surgeon)  [facility]   Certification Dates:  5-20-24 to 12-31-24    Current Problem: [ M75.122]    1. Nontraumatic complete tear of left rotator cuff                Surgery: s/p Lt shoulder arthroscopy, RTCR (subscapularis & supraspinatus), extensive intra-articular debridement & synovectomy, SAD, acromioplasty & open subpectoral biceps tenodesis on 4/5/24     Operative Findings: Full-thickness supraspinatus tear, full-thickness upper border subscapularis tear, degenerative SLAP tear and biceps tenosynovitis, subacromial impingement/bursitis     Subjective:  Pt stated he got the leaf blower out & he tried to pull the cord to start it.  It was a behind the back to front pull and he felt an immediate strain, so he used the Rt arm to help start the blower.       Patient's Living Environment/PLOF: Pt lives with spouse & son, has daughter in [employer].  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, RN    Patient's Therapy Goals: to return to baseline/PLOF for home & previous activities, yardwork, home maintenance w/o limitation     Pain:  Intensity: 1-2/10            Location: Lt Shoulder, diffuse, more soreness than pain, tightness today            Duration: Intermittent, very infrequent            Aggravating Factor: movement, static position under load, OH with load             Alleviating Factor:  OTC meds, prn    HPI:  Pt had Lt RTCT for awhile (degenerative) & previous Lt distal biceps repair, found to have large spur  Social Factors:  1-2 personal  factors &/or comorbidities     Precautions:  Per Protocol  Objective     Shoulder AROM: (degrees) Left   Flexion 162   Abduction 166   ER @ Neutral 77   ER @ 90 Abd 90   ER, Functional T2   IR, Functional T10   IR, @ 90 Abd 78/90     Shoulder Strength: MMT Left   Flexion -4/5   C5 Abduction -4/5   ER -4/5   IR +4/5   C6, Elbow Flex, Wrist Ext 5/5   C7, Elbow Ext, Wrist Flex 5/5     Scapular Strength: MMT Left   Mid Trap (Prone) -3/5   Low Trap (Prone) -3/5   Serratus Anterior (Supine) 4/5   *denotes pain with motion or muscle testing    HEP Compliance:  100  Outcomes:  Quick DASH:  20.45%    Exercises:      IR, 30 lbs, 3 sec hold, 2 x 15 sec  Rows, 2 x 15, 40 lbs  ER stretch @ 90 abd, 2 lbs, 10 x 10 sec  Horizontal Abd/Add, 2 x 15, 30 lbs  Prone posterior Delt raises on P-ball, 5 lbs, 2 x 10  Prone ER/Abd on P-ball, 5 lbs, 2 x 10  High to Low Diagonal, with thumb rotation, 5 lbs, 2 x 15   Low to High Diagonal, with thumb rotation, 5 lbs, 2 x 15   Std Posterior Delts, 2 x 15, 5 lbs    Therapeutic Activity:      UBE, Forward, Bkwd, 3 lbs, 2 min each  Body Blade, 2 positions at neutral, 2 at shld level, 2 x 30 sec each  Shld Flex, 1 x 45 sec, 8 lbs, 1 x 40 sec, 8 lbs  BOSU Push Ups  Forward Crawl on Wall, 3 lbs, 20 x  ER @ 90, black, 2 x 15  IR @ 90, black, 2 x 15  Wall [place], 2(5 lbs), 2 x 15    Manual:  PROM into shld flexion, and ER at 90 degrees abd, pt supine, light distraction,  upper trap stretching, scapular mobs in right sidelying, prone ER @ 90 abd    Assessment:     Pt has made progress to date towards all goals but still reports he is not back to baseline and lacks strength into ER, at shoulder level & OH.  Manual performed as noted above.  He continues to demonstrate improvements in activity tolerance & strength.  Discussed aftercare program with patient, also insusance limitations.  He will call insurance & read over aftercare program.          Plan: Continue per POC, working on progression towards FROM,  all planes        PT Goal 1 MET        Start:  05/20/24    Expected End:  12-31-24       Pt will decrease pain, Lt shld, to < or = 2-3/10 to improve tolerance to ADL's, work tasks at home & work.             PT Goal 2 Progressing        Start:  05/20/24    Expected End:  12-31-24       Pt will improve Lt shld AROM to w/in 10-15 degrees AROM all movements to improve functional reaching, OH activities, dressing.             PT Goal 3 MET        Start:  05/20/24    Expected End:  12-31-24     Pt will decrease Quick Dash score to < or = 20% to demonstrate functional improvement.                PT Goal 4, Start:  05/20/24    Expected End:  12-31-24  Progressing        Patient will increase Lt UE/shld strength to > or +4/5 to improve functional use of his dominant side for work & heavier ADL's.

## 2024-10-29 ENCOUNTER — APPOINTMENT (OUTPATIENT)
Dept: PHYSICAL THERAPY | Facility: CLINIC | Age: 59
End: 2024-10-29
Payer: COMMERCIAL

## 2024-10-31 ENCOUNTER — APPOINTMENT (OUTPATIENT)
Dept: PHYSICAL THERAPY | Facility: CLINIC | Age: 59
End: 2024-10-31
Payer: COMMERCIAL

## (undated) DEVICE — Device

## (undated) DEVICE — GLOVE, PROTEXIS PI CLASSIC, SZ-6.5, PF, LF

## (undated) DEVICE — GLOVE, SURGICAL, PROTEXIS PI BLUE W/NEUTHERA, 6.5, PF, LF

## (undated) DEVICE — SUTURE, CTD, VICRYL, 2-0, UND, BR, CT-2

## (undated) DEVICE — STRIP, SKIN CLOSURE, STERI STRIP, REINFORCED, 0.5 X 4 IN

## (undated) DEVICE — TUBING, SUCTION, 6MM X 10, CLEAN N-COND

## (undated) DEVICE — SUTURE, MONOCRYL, 3-0, 27 IN, PS-2, UNDYED

## (undated) DEVICE — PROBE, APOLLO RF, 90 DEG, EXTRA LARTGE

## (undated) DEVICE — BLOCK, FOAM, NEEDLE POP -N-COUNT, 1840, BLACK

## (undated) DEVICE — SUTURE PASSR, SELF, FIRSTPASS, STR, DISP

## (undated) DEVICE — TUBING, PUMP MAIN 16FT STERILE

## (undated) DEVICE — KIT, BEACH CHAIR TRIMANO

## (undated) DEVICE — PEN, SKIN MARKER FOR TREPHINES & PUNCHES

## (undated) DEVICE — DRAPE, INSTRUMENT, W/POUCH, STERI DRAPE, 7 X 11 IN, DISPOSABLE, STERILE

## (undated) DEVICE — TIP, SUCTION, YANKAUER, FLEXIBLE

## (undated) DEVICE — TRIPLEDAM TWIST-IN CANNULA W/ VALVE 8.25MM X 7 CM

## (undated) DEVICE — DRESSING, TRANSPARENT, TEGADERM, FRAME STYLE, 4 X 4.5, STRL

## (undated) DEVICE — CUTTER, BONE, 5.5 X 13

## (undated) DEVICE — BLANKET, LOWER BODY, VHA PLUS, ADULT

## (undated) DEVICE — DRESSING, NON-ADHERENT, TELFA, OUCHLESS, 3 X 4 IN, STERILE

## (undated) DEVICE — GLOVE, SURGICAL, PROTEXIS PI BLUE W/NEUTHERA, 8.0, PF, LF

## (undated) DEVICE — DRAPE, INCISE, ANTIMICROBIAL, IOBAN 2, 13 X 13 IN, DISPOSABLE, STERILE

## (undated) DEVICE — GLOVE, SURGICAL, PROTEXIS PI , 7.5, PF, LF

## (undated) DEVICE — KIT, HIP, FOR 1.8MM Q-FIX IMP, DISP

## (undated) DEVICE — NEEDLE, TAPERED, W/ NITIONAL LOOP, T-5, 1/2 CIRCLE, 26.5MM LONG